# Patient Record
Sex: FEMALE | Race: WHITE | Employment: FULL TIME | URBAN - METROPOLITAN AREA
[De-identification: names, ages, dates, MRNs, and addresses within clinical notes are randomized per-mention and may not be internally consistent; named-entity substitution may affect disease eponyms.]

---

## 2021-12-19 ENCOUNTER — APPOINTMENT (OUTPATIENT)
Dept: CT IMAGING | Age: 26
End: 2021-12-19
Attending: EMERGENCY MEDICINE
Payer: MEDICAID

## 2021-12-19 ENCOUNTER — HOSPITAL ENCOUNTER (EMERGENCY)
Age: 26
Discharge: HOME | End: 2021-12-19
Attending: EMERGENCY MEDICINE
Payer: MEDICAID

## 2021-12-19 VITALS
SYSTOLIC BLOOD PRESSURE: 118 MMHG | WEIGHT: 130 LBS | TEMPERATURE: 97.6 F | RESPIRATION RATE: 18 BRPM | BODY MASS INDEX: 21.66 KG/M2 | HEART RATE: 75 BPM | OXYGEN SATURATION: 100 % | DIASTOLIC BLOOD PRESSURE: 78 MMHG | HEIGHT: 65 IN

## 2021-12-19 DIAGNOSIS — R11.2 NAUSEA AND VOMITING, INTRACTABILITY OF VOMITING NOT SPECIFIED, UNSPECIFIED VOMITING TYPE: ICD-10-CM

## 2021-12-19 DIAGNOSIS — R10.31 ABDOMINAL PAIN, RIGHT LOWER QUADRANT: Primary | ICD-10-CM

## 2021-12-19 DIAGNOSIS — N83.201 CYST OF RIGHT OVARY: ICD-10-CM

## 2021-12-19 LAB
ANION GAP SERPL CALC-SCNC: 2 MMOL/L (ref 5–15)
APPEARANCE UR: ABNORMAL
BACTERIA URNS QL MICRO: NEGATIVE /HPF
BASOPHILS # BLD: 0.1 K/UL (ref 0–0.1)
BASOPHILS NFR BLD: 1 % (ref 0–1)
BILIRUB UR QL: NEGATIVE
BUN SERPL-MCNC: 7 MG/DL (ref 6–20)
BUN/CREAT SERPL: 10 (ref 12–20)
CA-I BLD-MCNC: 9.4 MG/DL (ref 8.5–10.1)
CHLORIDE SERPL-SCNC: 109 MMOL/L (ref 97–108)
CO2 SERPL-SCNC: 28 MMOL/L (ref 21–32)
COLOR UR: ABNORMAL
CREAT SERPL-MCNC: 0.72 MG/DL (ref 0.55–1.02)
DIFFERENTIAL METHOD BLD: NORMAL
EOSINOPHIL # BLD: 0.3 K/UL (ref 0–0.4)
EOSINOPHIL NFR BLD: 3 % (ref 0–7)
ERYTHROCYTE [DISTWIDTH] IN BLOOD BY AUTOMATED COUNT: 12.6 % (ref 11.5–14.5)
GLUCOSE SERPL-MCNC: 89 MG/DL (ref 65–100)
GLUCOSE UR STRIP.AUTO-MCNC: NEGATIVE MG/DL
HCT VFR BLD AUTO: 39.5 % (ref 35–47)
HGB BLD-MCNC: 13.2 G/DL (ref 11.5–16)
HGB UR QL STRIP: NEGATIVE
IMM GRANULOCYTES # BLD AUTO: 0 K/UL (ref 0–0.04)
IMM GRANULOCYTES NFR BLD AUTO: 0 % (ref 0–0.5)
KETONES UR QL STRIP.AUTO: 5 MG/DL
LEUKOCYTE ESTERASE UR QL STRIP.AUTO: NEGATIVE
LIPASE SERPL-CCNC: 86 U/L (ref 73–393)
LYMPHOCYTES # BLD: 2.3 K/UL (ref 0.8–3.5)
LYMPHOCYTES NFR BLD: 32 % (ref 12–49)
MCH RBC QN AUTO: 31.1 PG (ref 26–34)
MCHC RBC AUTO-ENTMCNC: 33.4 G/DL (ref 30–36.5)
MCV RBC AUTO: 93.2 FL (ref 80–99)
MONOCYTES # BLD: 0.4 K/UL (ref 0–1)
MONOCYTES NFR BLD: 5 % (ref 5–13)
MUCOUS THREADS URNS QL MICRO: ABNORMAL /LPF
NEUTS SEG # BLD: 4.4 K/UL (ref 1.8–8)
NEUTS SEG NFR BLD: 59 % (ref 32–75)
NITRITE UR QL STRIP.AUTO: NEGATIVE
NRBC # BLD: 0 K/UL (ref 0–0.01)
NRBC BLD-RTO: 0 PER 100 WBC
PH UR STRIP: 8 [PH] (ref 5–8)
PLATELET # BLD AUTO: 218 K/UL (ref 150–400)
PMV BLD AUTO: 9.7 FL (ref 8.9–12.9)
POTASSIUM SERPL-SCNC: 4 MMOL/L (ref 3.5–5.1)
PROT UR STRIP-MCNC: NEGATIVE MG/DL
RBC # BLD AUTO: 4.24 M/UL (ref 3.8–5.2)
RBC #/AREA URNS HPF: ABNORMAL /HPF (ref 0–5)
SODIUM SERPL-SCNC: 139 MMOL/L (ref 136–145)
SP GR UR REFRACTOMETRY: 1.02 (ref 1–1.03)
UROBILINOGEN UR QL STRIP.AUTO: 2 EU/DL (ref 0.1–1)
WBC # BLD AUTO: 7.4 K/UL (ref 3.6–11)
WBC URNS QL MICRO: ABNORMAL /HPF (ref 0–4)

## 2021-12-19 PROCEDURE — 99283 EMERGENCY DEPT VISIT LOW MDM: CPT

## 2021-12-19 PROCEDURE — 83690 ASSAY OF LIPASE: CPT

## 2021-12-19 PROCEDURE — 74177 CT ABD & PELVIS W/CONTRAST: CPT

## 2021-12-19 PROCEDURE — 74011000636 HC RX REV CODE- 636: Performed by: EMERGENCY MEDICINE

## 2021-12-19 PROCEDURE — 74011250636 HC RX REV CODE- 250/636: Performed by: NURSE PRACTITIONER

## 2021-12-19 PROCEDURE — 85025 COMPLETE CBC W/AUTO DIFF WBC: CPT

## 2021-12-19 PROCEDURE — 96375 TX/PRO/DX INJ NEW DRUG ADDON: CPT

## 2021-12-19 PROCEDURE — 81001 URINALYSIS AUTO W/SCOPE: CPT

## 2021-12-19 PROCEDURE — 74011250636 HC RX REV CODE- 250/636: Performed by: EMERGENCY MEDICINE

## 2021-12-19 PROCEDURE — 36415 COLL VENOUS BLD VENIPUNCTURE: CPT

## 2021-12-19 PROCEDURE — 80048 BASIC METABOLIC PNL TOTAL CA: CPT

## 2021-12-19 PROCEDURE — 96374 THER/PROPH/DIAG INJ IV PUSH: CPT

## 2021-12-19 RX ORDER — ONDANSETRON 4 MG/1
4 TABLET, FILM COATED ORAL
Qty: 20 TABLET | Refills: 0 | Status: SHIPPED | OUTPATIENT
Start: 2021-12-19

## 2021-12-19 RX ORDER — KETOROLAC TROMETHAMINE 30 MG/ML
15 INJECTION, SOLUTION INTRAMUSCULAR; INTRAVENOUS
Status: COMPLETED | OUTPATIENT
Start: 2021-12-19 | End: 2021-12-19

## 2021-12-19 RX ORDER — KETOROLAC TROMETHAMINE 10 MG/1
10 TABLET, FILM COATED ORAL
Qty: 20 TABLET | Refills: 0 | Status: SHIPPED | OUTPATIENT
Start: 2021-12-19 | End: 2021-12-24

## 2021-12-19 RX ORDER — ONDANSETRON 2 MG/ML
4 INJECTION INTRAMUSCULAR; INTRAVENOUS
Status: COMPLETED | OUTPATIENT
Start: 2021-12-19 | End: 2021-12-19

## 2021-12-19 RX ADMIN — IOPAMIDOL 100 ML: 755 INJECTION, SOLUTION INTRAVENOUS at 16:11

## 2021-12-19 RX ADMIN — KETOROLAC TROMETHAMINE 15 MG: 30 INJECTION, SOLUTION INTRAMUSCULAR; INTRAVENOUS at 15:22

## 2021-12-19 RX ADMIN — ONDANSETRON 4 MG: 2 INJECTION INTRAMUSCULAR; INTRAVENOUS at 15:22

## 2021-12-19 RX ADMIN — SODIUM CHLORIDE 1000 ML: 9 INJECTION, SOLUTION INTRAVENOUS at 15:22

## 2021-12-19 NOTE — Clinical Note
[address] [facility] EMERGENCY DEPT  [address] [place] [address] [place] [address]  [phone]    Work/School Note    Date: 12/19/2021    To Whom It May concern:    Saman Mendenhall was seen and treated today in the emergency room by the following provider(s):  Attending Provider: Antonella Henry MD.      Saman Mendenhall is excused from work/school on 12/19/21 and 12/20/21. She is medically clear to return to work/school on 12/21/2021.        Sincerely,          Craig Pena MD

## 2021-12-19 NOTE — ED PROVIDER NOTES
EMERGENCY DEPARTMENT HISTORY AND PHYSICAL EXAM      Date: 12/19/2021  Patient Name: John Novak    History of Presenting Illness     Chief Complaint   Patient presents with    Abdominal Pain       History Provided By: Patient    HPI: John Novak, 32 y.o. female with a past medical history significant No significant past medical history presents to the ED with cc of 2 days history of right lower quadrant abdominal pain. Patient ports pain is squeezing, without radiation, worse with movement or palpation, improved with rest.  Patient also reports dysuria. Patient denies fevers or chills, reports nausea without vomiting. There are no other complaints, changes, or physical findings at this time. PCP: None    No current facility-administered medications on file prior to encounter. Current Outpatient Medications on File Prior to Encounter   Medication Sig Dispense Refill    [DISCONTINUED] risperiDONE (RISPERDAL) 1 mg tablet Take 0.5 mg by mouth two (2) times a day.  [DISCONTINUED] norgestimate-ethinyl estradiol (ORTHO-CYCLEN) 0.25-35 mg-mcg per tablet Take 1 Tab by mouth daily. 1 Package 11       Past History     Past Medical History:  Past Medical History:   Diagnosis Date    Psychiatric problem        Past Surgical History:  History reviewed. No pertinent surgical history. Family History:  History reviewed. No pertinent family history. Social History:  Social History     Tobacco Use    Smoking status: Current Every Day Smoker    Smokeless tobacco: Current User   Substance Use Topics    Alcohol use: Yes    Drug use: Never       Allergies:  No Known Allergies      Review of Systems   Review of Systems   Constitutional: Negative for chills and fever. HENT: Negative for sinus pressure and sinus pain. Eyes: Negative for photophobia and redness. Respiratory: Negative for shortness of breath and wheezing. Cardiovascular: Negative for chest pain and palpitations. Gastrointestinal: Positive for abdominal pain and nausea. Genitourinary: Negative for flank pain and hematuria. Musculoskeletal: Negative for arthralgias and gait problem. Skin: Negative for color change and pallor. Neurological: Negative for dizziness and weakness. Review of Systems    Physical Exam   Physical Exam  Constitutional:       General: No acute distress. Appearance: Normal appearance. Not toxic-appearing. HENT:      Head: Normocephalic and atraumatic. Nose: Nose normal.      Mouth/Throat:      Mouth: Mucous membranes are moist.   Eyes:      Extraocular Movements: Extraocular movements intact. Pupils: Pupils are equal, round, and reactive to light. Cardiovascular:      Rate and Rhythm: Normal rate. Pulses: Normal pulses. Pulmonary:      Effort: Pulmonary effort is normal.      Breath sounds: No stridor. Abdominal:      General: Abdomen is flat. There is no distension. Right lower quadrant tenderness with voluntary guarding without rebound. Musculoskeletal:         General: Normal range of motion. Cervical back: Normal range of motion and neck supple. Skin:     General: Skin is warm and dry. Capillary Refill: Capillary refill takes less than 2 seconds. Neurological:      General: No focal deficit present. Mental Status: Aert and oriented to person, place, and time.    Psychiatric:         Mood and Affect: Mood normal.         Behavior: Behavior normal.       Physical Exam    Lab and Diagnostic Study Results     Labs -     Recent Results (from the past 12 hour(s))   URINALYSIS W/MICROSCOPIC    Collection Time: 12/19/21  3:00 PM   Result Value Ref Range    Color Yellow/Straw      Appearance Turbid (A) Clear      Specific gravity 1.019 1.003 - 1.030      pH (UA) 8.0 5.0 - 8.0      Protein Negative Negative mg/dL    Glucose Negative Negative mg/dL    Ketone 5 (A) Negative mg/dL    Bilirubin Negative Negative      Blood Negative Negative Urobilinogen 2.0 (H) 0.1 - 1.0 EU/dL    Nitrites Negative Negative      Leukocyte Esterase Negative Negative      WBC 0-4 0 - 4 /hpf    RBC 0-5 0 - 5 /hpf    Bacteria Negative Negative /hpf    Mucus Trace /lpf   CBC WITH AUTOMATED DIFF    Collection Time: 12/19/21  3:00 PM   Result Value Ref Range    WBC 7.4 3.6 - 11.0 K/uL    RBC 4.24 3.80 - 5.20 M/uL    HGB 13.2 11.5 - 16.0 g/dL    HCT 39.5 35.0 - 47.0 %    MCV 93.2 80.0 - 99.0 FL    MCH 31.1 26.0 - 34.0 PG    MCHC 33.4 30.0 - 36.5 g/dL    RDW 12.6 11.5 - 14.5 %    PLATELET 310 082 - 239 K/uL    MPV 9.7 8.9 - 12.9 FL    NRBC 0.0 0.0  WBC    ABSOLUTE NRBC 0.00 0.00 - 0.01 K/uL    NEUTROPHILS 59 32 - 75 %    LYMPHOCYTES 32 12 - 49 %    MONOCYTES 5 5 - 13 %    EOSINOPHILS 3 0 - 7 %    BASOPHILS 1 0 - 1 %    IMMATURE GRANULOCYTES 0 0 - 0.5 %    ABS. NEUTROPHILS 4.4 1.8 - 8.0 K/UL    ABS. LYMPHOCYTES 2.3 0.8 - 3.5 K/UL    ABS. MONOCYTES 0.4 0.0 - 1.0 K/UL    ABS. EOSINOPHILS 0.3 0.0 - 0.4 K/UL    ABS. BASOPHILS 0.1 0.0 - 0.1 K/UL    ABS. IMM. GRANS. 0.0 0.00 - 0.04 K/UL    DF AUTOMATED     METABOLIC PANEL, BASIC    Collection Time: 12/19/21  3:00 PM   Result Value Ref Range    Sodium 139 136 - 145 mmol/L    Potassium 4.0 3.5 - 5.1 mmol/L    Chloride 109 (H) 97 - 108 mmol/L    CO2 28 21 - 32 mmol/L    Anion gap 2 (L) 5 - 15 mmol/L    Glucose 89 65 - 100 mg/dL    BUN 7 6 - 20 mg/dL    Creatinine 0.72 0.55 - 1.02 mg/dL    BUN/Creatinine ratio 10 (L) 12 - 20      GFR est AA >60 >60 ml/min/1.73m2    GFR est non-AA >60 >60 ml/min/1.73m2    Calcium 9.4 8.5 - 10.1 mg/dL   LIPASE    Collection Time: 12/19/21  3:00 PM   Result Value Ref Range    Lipase 86 73 - 393 U/L       Radiologic Studies -   @[email or web address]@  CT Results  (Last 48 hours)               12/19/21 1610  CT ABD PELV W CONT Final result    Impression:  1. Collapsing right ovarian follicle. Small free pelvic fluid is physiologic in   volume. 2. No urinary or bowel dilation.        Narrative:  Right lower quadrant pain. No comparison. Technique: Axial images abdomen pelvis with IV contrast and multiplanar   reformatting. 100 cc Isovue-370 administered IV. Dose reduction: All CT scans at this facility are performed using dose reduction   optimization techniques as appropriate to a performed exam including the   following: Automated exposure control, adjustments of the mA and/or kV according   to patient's size, or use of iterative reconstruction technique. FINDINGS: Lung bases clear. Liver, gallbladder, spleen, pancreas, adrenal   glands, kidneys, bladder, uterus unremarkable. No urinary collecting system   dilation. Abdominal aorta without aneurysm or dissection. Multiple follicles   left ovary. Dominant follicle right ovary appears slightly crenulated, 1.9 cm,   likely collapsing. There is a small amount of free fluid in the pelvis. The   bowel is not dilated. Visualized portion of the appendix contains air, is   nondilated, lies closely adjacent to the right ovary. No free air or   lymphadenopathy. Bony structures intact. CXR Results  (Last 48 hours)    None            Medical Decision Making   - I am the first provider for this patient. - I reviewed the vital signs, available nursing notes, past medical history, past surgical history, family history and social history. - Initial assessment performed. The patients presenting problems have been discussed, and they are in agreement with the care plan formulated and outlined with them. I have encouraged them to ask questions as they arise throughout their visit. Vital Signs-Reviewed the patient's vital signs. Patient Vitals for the past 12 hrs:   Temp Pulse Resp BP SpO2   12/19/21 1440 97.6 °F (36.4 °C) 75 18 118/78 100 %       ED Course:     ED Course as of 12/19/21 1655   Sun Dec 19, 2021   1653 Patient reports feeling better at this time.   Discussed the findings consistent with right ovarian cyst, otherwise nonsurgical abdomen. Will treat with pain and nausea medicine, discussed need for close follow-up as well as return precautions. [CS]      ED Course User Index  [CS] Abhijit Oliver MD       Disposition   Disposition: DC- Adult Discharges: All of the diagnostic tests were reviewed and questions answered. Diagnosis, care plan and treatment options were discussed. The patient understands the instructions and will follow up as directed. The patients results have been reviewed with them. They have been counseled regarding their diagnosis. The patient verbally convey understanding and agreement of the signs, symptoms, diagnosis, treatment and prognosis and additionally agrees to follow up as recommended with their PCP in 24 - 48 hours. They also agree with the care-plan and convey that all of their questions have been answered. I have also put together some discharge instructions for them that include: 1) educational information regarding their diagnosis, 2) how to care for their diagnosis at home, as well a 3) list of reasons why they would want to return to the ED prior to their follow-up appointment, should their condition change. DC-The patient was given verbal follow-up instructions        DISCHARGE PLAN:  1. There are no discharge medications for this patient. 2.   Follow-up Information     Follow up With Specialties Details Why [address] [place] [address] [id number]   As needed [address] [place] [address]  [phone]        3. Return to ED if worse   4. Current Discharge Medication List      START taking these medications    Details   ketorolac (TORADOL) 10 mg tablet Take 1 Tablet by mouth every six (6) hours as needed for Pain for up to 5 days. Qty: 20 Tablet, Refills: 0  Start date: 12/19/2021, End date: 12/24/2021      ondansetron hcl (Zofran) 4 mg tablet Take 1 Tablet by mouth every eight (8) hours as needed for Nausea.   Qty: 20 Tablet, Refills: 0  Start date: 12/19/2021               Diagnosis     Clinical Impression:   1. Abdominal pain, right lower quadrant    2. Nausea and vomiting, intractability of vomiting not specified, unspecified vomiting type    3. Cyst of right ovary        Attestations:    Juan Bales MD    Please note that this dictation was completed with [employer], the computer voice recognition software. Quite often unanticipated grammatical, syntax, homophones, and other interpretive errors are inadvertently transcribed by the computer software. Please disregard these errors. Please excuse any errors that have escaped final proofreading. Thank you.

## 2021-12-19 NOTE — DISCHARGE INSTRUCTIONS
Thank you! Thank you for allowing me to care for you in the emergency department. I sincerely hope that you are satisfied with your visit today. It is my goal to provide you with excellent care. Below you will find a list of your labs and imaging from your visit today. Should you have any questions regarding these results please do not hesitate to call the emergency department. Labs -     Recent Results (from the past 12 hour(s))   URINALYSIS W/MICROSCOPIC    Collection Time: 12/19/21  3:00 PM   Result Value Ref Range    Color Yellow/Straw      Appearance Turbid (A) Clear      Specific gravity 1.019 1.003 - 1.030      pH (UA) 8.0 5.0 - 8.0      Protein Negative Negative mg/dL    Glucose Negative Negative mg/dL    Ketone 5 (A) Negative mg/dL    Bilirubin Negative Negative      Blood Negative Negative      Urobilinogen 2.0 (H) 0.1 - 1.0 EU/dL    Nitrites Negative Negative      Leukocyte Esterase Negative Negative      WBC 0-4 0 - 4 /hpf    RBC 0-5 0 - 5 /hpf    Bacteria Negative Negative /hpf    Mucus Trace /lpf   CBC WITH AUTOMATED DIFF    Collection Time: 12/19/21  3:00 PM   Result Value Ref Range    WBC 7.4 3.6 - 11.0 K/uL    RBC 4.24 3.80 - 5.20 M/uL    HGB 13.2 11.5 - 16.0 g/dL    HCT 39.5 35.0 - 47.0 %    MCV 93.2 80.0 - 99.0 FL    MCH 31.1 26.0 - 34.0 PG    MCHC 33.4 30.0 - 36.5 g/dL    RDW 12.6 11.5 - 14.5 %    PLATELET 843 399 - 900 K/uL    MPV 9.7 8.9 - 12.9 FL    NRBC 0.0 0.0  WBC    ABSOLUTE NRBC 0.00 0.00 - 0.01 K/uL    NEUTROPHILS 59 32 - 75 %    LYMPHOCYTES 32 12 - 49 %    MONOCYTES 5 5 - 13 %    EOSINOPHILS 3 0 - 7 %    BASOPHILS 1 0 - 1 %    IMMATURE GRANULOCYTES 0 0 - 0.5 %    ABS. NEUTROPHILS 4.4 1.8 - 8.0 K/UL    ABS. LYMPHOCYTES 2.3 0.8 - 3.5 K/UL    ABS. MONOCYTES 0.4 0.0 - 1.0 K/UL    ABS. EOSINOPHILS 0.3 0.0 - 0.4 K/UL    ABS. BASOPHILS 0.1 0.0 - 0.1 K/UL    ABS. IMM.  GRANS. 0.0 0.00 - 0.04 K/UL    DF AUTOMATED     METABOLIC PANEL, BASIC    Collection Time: 12/19/21  3:00 PM Result Value Ref Range    Sodium 139 136 - 145 mmol/L    Potassium 4.0 3.5 - 5.1 mmol/L    Chloride 109 (H) 97 - 108 mmol/L    CO2 28 21 - 32 mmol/L    Anion gap 2 (L) 5 - 15 mmol/L    Glucose 89 65 - 100 mg/dL    BUN 7 6 - 20 mg/dL    Creatinine 0.72 0.55 - 1.02 mg/dL    BUN/Creatinine ratio 10 (L) 12 - 20      GFR est AA >60 >60 ml/min/1.73m2    GFR est non-AA >60 >60 ml/min/1.73m2    Calcium 9.4 8.5 - 10.1 mg/dL   LIPASE    Collection Time: 12/19/21  3:00 PM   Result Value Ref Range    Lipase 86 73 - 393 U/L       Radiologic Studies -   CT ABD PELV W CONT   Final Result   1. Collapsing right ovarian follicle. Small free pelvic fluid is physiologic in   volume. 2. No urinary or bowel dilation. CT Results  (Last 48 hours)                 12/19/21 1610  CT ABD PELV W CONT Final result    Impression:  1. Collapsing right ovarian follicle. Small free pelvic fluid is physiologic in   volume. 2. No urinary or bowel dilation. Narrative:  Right lower quadrant pain. No comparison. Technique: Axial images abdomen pelvis with IV contrast and multiplanar   reformatting. 100 cc Isovue-370 administered IV. Dose reduction: All CT scans at this facility are performed using dose reduction   optimization techniques as appropriate to a performed exam including the   following: Automated exposure control, adjustments of the mA and/or kV according   to patient's size, or use of iterative reconstruction technique. FINDINGS: Lung bases clear. Liver, gallbladder, spleen, pancreas, adrenal   glands, kidneys, bladder, uterus unremarkable. No urinary collecting system   dilation. Abdominal aorta without aneurysm or dissection. Multiple follicles   left ovary. Dominant follicle right ovary appears slightly crenulated, 1.9 cm,   likely collapsing. There is a small amount of free fluid in the pelvis. The   bowel is not dilated.  Visualized portion of the appendix contains air, is   nondilated, lies closely adjacent to the right ovary. No free air or   lymphadenopathy. Bony structures intact. CXR Results  (Last 48 hours)      None               If you feel that you have not received excellent quality care or timely care, please ask to speak to the nurse manager. Please choose us in the future for your continued health care needs. ------------------------------------------------------------------------------------------------------------  The exam and treatment you received in the Emergency Department were for an urgent problem and are not intended as complete care. It is important that you follow-up with a doctor, nurse practitioner, or physician assistant to:  (1) confirm your diagnosis,  (2) re-evaluation of changes in your illness and treatment, and  (3) for ongoing care. If your symptoms become worse or you do not improve as expected and you are unable to reach your usual health care provider, you should return to the Emergency Department. We are available 24 hours a day. Please take your discharge instructions with you when you go to your follow-up appointment. If you have any problem arranging a follow-up appointment, contact the Emergency Department immediately. If a prescription has been provided, please have it filled as soon as possible to prevent a delay in treatment. Read the entire medication instruction sheet provided to you by the pharmacy. If you have any questions or reservations about taking the medication due to side effects or interactions with other medications, please call your primary care physician or contact the ER to speak with the charge nurse. Make an appointment with your family doctor or the physician you were referred to for follow-up of this visit as instructed on your discharge paperwork, as this is a mandatory follow-up. Return to the ER if you are unable to be seen or if you are unable to be seen in a timely manner.     If you have any problem arranging the follow-up visit, contact the Emergency Department immediately.

## 2022-01-10 ENCOUNTER — HOSPITAL ENCOUNTER (EMERGENCY)
Age: 27
Discharge: HOME | End: 2022-01-10
Payer: MEDICAID

## 2022-01-10 VITALS
TEMPERATURE: 97.9 F | RESPIRATION RATE: 16 BRPM | WEIGHT: 120 LBS | HEART RATE: 105 BPM | SYSTOLIC BLOOD PRESSURE: 117 MMHG | BODY MASS INDEX: 19.99 KG/M2 | HEIGHT: 65 IN | DIASTOLIC BLOOD PRESSURE: 74 MMHG | OXYGEN SATURATION: 100 %

## 2022-01-10 DIAGNOSIS — K08.89 DENTALGIA: Primary | ICD-10-CM

## 2022-01-10 DIAGNOSIS — J06.9 VIRAL URI WITH COUGH: ICD-10-CM

## 2022-01-10 PROCEDURE — 74011250637 HC RX REV CODE- 250/637: Performed by: PHYSICIAN ASSISTANT

## 2022-01-10 PROCEDURE — U0005 INFEC AGEN DETEC AMPLI PROBE: HCPCS

## 2022-01-10 PROCEDURE — 99283 EMERGENCY DEPT VISIT LOW MDM: CPT

## 2022-01-10 RX ORDER — HYDROCODONE BITARTRATE AND ACETAMINOPHEN 5; 325 MG/1; MG/1
1 TABLET ORAL ONCE
Status: COMPLETED | OUTPATIENT
Start: 2022-01-10 | End: 2022-01-10

## 2022-01-10 RX ORDER — PENICILLIN V POTASSIUM 500 MG/1
500 TABLET, FILM COATED ORAL 4 TIMES DAILY
Qty: 28 TABLET | Refills: 0 | Status: SHIPPED | OUTPATIENT
Start: 2022-01-10 | End: 2022-01-17

## 2022-01-10 RX ADMIN — HYDROCODONE BITARTRATE AND ACETAMINOPHEN 1 TABLET: 5; 325 TABLET ORAL at 15:46

## 2022-01-10 NOTE — Clinical Note
[facility] EMERGENCY DEPT  [address] [place] [id number]  [phone]    Work/School Note    Date: 1/10/2022    To Whom It May concern:    Yakelin Peralta was seen and treated today in the emergency room by the following provider(s):  Physician Assistant: Galileo Sofia PA-C. Yakelin Peralta is excused from work/school on 01/10/22 and 01/11/22. She is medically clear to return to work/school on 1/12/2022.        Sincerely,          Johanne De Souza PA-C

## 2022-01-10 NOTE — ED NOTES
Pt ambulated A&ox4, GCS 15 to ED lobby. In possession of personal belongings and discharge instructions.

## 2022-01-10 NOTE — ED PROVIDER NOTES
EMERGENCY DEPARTMENT HISTORY AND PHYSICAL EXAM      Date: 1/10/2022  Patient Name: Randy Vega    History of Presenting Illness     Chief Complaint   Patient presents with    Dental Pain    Covid Testing       History Provided By: Patient    HPI: Randy Vega, 32 y.o. female with a past medical history significant psychiatric problem presents to the ED with cc of dental pain. Pt reports pain to the back upper left-side of her mouth. States that she has a dentist appointment tomorrow but missed work today and is requesting work note. Pt denies any fever, chills, difficulty swallowing, voice change, facial swelling, sore throat, or purulent drainage from site of dental pain. Pt additionally requesting to be tested for covid. She reports sick contacts who recently tested positive for covid. Pt denies being vaccinated against covid yet. States that she is otherwise well and has no further concerns. She specifically denies any chest pain, shortness of breath, cough, congestion, abdominal pain, nausea, vomit, diarrhea, difficulty urinating, dysuria, hematuria, headaches, light headedness, dizziness, diaphoresis, or rash. There are no other complaints, changes, or physical findings at this time. PCP: None    No current facility-administered medications on file prior to encounter. Current Outpatient Medications on File Prior to Encounter   Medication Sig Dispense Refill    ondansetron hcl (Zofran) 4 mg tablet Take 1 Tablet by mouth every eight (8) hours as needed for Nausea. 20 Tablet 0       Past History     Past Medical History:  Past Medical History:   Diagnosis Date    Psychiatric problem        Past Surgical History:  History reviewed. No pertinent surgical history. Family History:  History reviewed. No pertinent family history.     Social History:  Social History     Tobacco Use    Smoking status: Current Every Day Smoker     Packs/day: 0.50    Smokeless tobacco: Current User Vaping Use    Vaping Use: Every day    Substances: Nicotine, THC, CBD   Substance Use Topics    Alcohol use: Yes     Comment: rarely     Drug use: Yes     Types: Marijuana       Allergies:  No Known Allergies      Review of Systems     Review of Systems   Constitutional: Negative. Negative for chills, diaphoresis and fever. HENT: Positive for dental problem. Negative for congestion, rhinorrhea and sore throat. Eyes: Negative. Respiratory: Negative. Negative for cough, chest tightness, shortness of breath and wheezing. Cardiovascular: Negative. Negative for chest pain and palpitations. Gastrointestinal: Negative. Negative for abdominal pain, diarrhea, nausea and vomiting. Genitourinary: Negative. Negative for difficulty urinating, dysuria, flank pain, frequency and hematuria. Musculoskeletal: Negative. Skin: Negative. Negative for rash. Neurological: Negative. Negative for dizziness, weakness, numbness and headaches. Psychiatric/Behavioral: Negative. All other systems reviewed and are negative. Physical Exam     Physical Exam  Vitals and nursing note reviewed. Constitutional:       General: She is not in acute distress. Appearance: Normal appearance. She is not ill-appearing or toxic-appearing. HENT:      Head: Normocephalic and atraumatic. Mouth/Throat:      Mouth: Mucous membranes are moist.      Dentition: Dental caries present. No dental abscesses. Pharynx: Oropharynx is clear. Eyes:      Extraocular Movements: Extraocular movements intact. Conjunctiva/sclera: Conjunctivae normal.      Pupils: Pupils are equal, round, and reactive to light. Cardiovascular:      Rate and Rhythm: Normal rate and regular rhythm. Pulses: Normal pulses. Heart sounds: Normal heart sounds. No murmur heard. No friction rub. No gallop. Pulmonary:      Effort: Pulmonary effort is normal.      Breath sounds: Normal breath sounds.  No wheezing, rhonchi or rales.   Abdominal:      General: There is no distension. Palpations: Abdomen is soft. Tenderness: There is no abdominal tenderness. There is no guarding or rebound. Musculoskeletal:      Cervical back: Neck supple. No tenderness. Skin:     General: Skin is warm and dry. Capillary Refill: Capillary refill takes less than 2 seconds. Findings: No rash. Neurological:      General: No focal deficit present. Mental Status: She is alert and oriented to person, place, and time. Psychiatric:         Mood and Affect: Mood normal.         Behavior: Behavior normal.         Lab and Diagnostic Study Results     Labs -   No results found for this or any previous visit (from the past 12 hour(s)). Radiologic Studies -   @lastxrresult@  CT Results  (Last 48 hours)    None        CXR Results  (Last 48 hours)    None            Medical Decision Making   - I am the first provider for this patient. - I reviewed the vital signs, available nursing notes, past medical history, past surgical history, family history and social history. - Initial assessment performed. The patients presenting problems have been discussed, and they are in agreement with the care plan formulated and outlined with them. I have encouraged them to ask questions as they arise throughout their visit. Vital Signs-Reviewed the patient's vital signs. Patient Vitals for the past 12 hrs:   Temp Pulse Resp BP SpO2   01/10/22 1435 97.9 °F (36.6 °C) (!) 105 16 117/74 100 %       Records Reviewed: Nursing Notes and Old Medical Records       Provider Notes (Medical Decision Making):     MDM  Number of Diagnoses or Management Options  Dentalgia  Viral URI with cough  Diagnosis management comments: Patient presents with dental pain. Stable vitals and exam without obvious abscess that needs drainage. Airway stable and patient speaking in full sentences. No red flags that make PTA, RPA, ludwigs angina concerning.  Will tx with dental ball, dental block PRN, antibiotics and outpatient analgesics. Pt was given information on dentists and importance of followup and no smoking. Amount and/or Complexity of Data Reviewed  Clinical lab tests: ordered and reviewed  Review and summarize past medical records: yes       ED Course:   3:37 PM  Pt reassessed. She has been advised to maintain appointment with dentist tomorrow and to start taking abx in the meantime. She has been educated regarding strict return precautions and conveys good understanding and agreement with care plan as outlined. Pt agrees to follow up as previously scheduled. She has also been informed that covid is a send out test and that she will be notified within the next 3-5 days if positive. She has no additional complaints or concerns and all of her questions have been answered. Anticipate discharge home shortly. Procedures   Medical Decision Makingedical Decision Making  Performed by: Mike Cantor PA-C  PROCEDURES:  Procedures       Disposition   Disposition: DC- Adult Discharges: All of the diagnostic tests were reviewed and questions answered. Diagnosis, care plan and treatment options were discussed. The patient understands the instructions and will follow up as directed. The patients results have been reviewed with them. They have been counseled regarding their diagnosis. The patient verbally convey understanding and agreement of the signs, symptoms, diagnosis, treatment and prognosis and additionally agrees to follow up as recommended with their PCP in 24 - 48 hours. They also agree with the care-plan and convey that all of their questions have been answered.   I have also put together some discharge instructions for them that include: 1) educational information regarding their diagnosis, 2) how to care for their diagnosis at home, as well a 3) list of reasons why they would want to return to the ED prior to their follow-up appointment, should their condition change. DISCHARGE PLAN:  1. Current Discharge Medication List      CONTINUE these medications which have NOT CHANGED    Details   ondansetron hcl (Zofran) 4 mg tablet Take 1 Tablet by mouth every eight (8) hours as needed for Nausea. Qty: 20 Tablet, Refills: 0           2. Follow-up Information     Follow up With Specialties Details Why [address] [place] [address] Dentistry Schedule an appointment as soon as possible for a visit in 1 week  [address] [place] [address]  [phone]        3. Return to ED if worse   4. Current Discharge Medication List      START taking these medications    Details   penicillin v potassium (VEETID) 500 mg tablet Take 1 Tablet by mouth four (4) times daily for 7 days. Qty: 28 Tablet, Refills: 0  Start date: 1/10/2022, End date: 1/17/2022               Diagnosis     Clinical Impression:   1. Dentalgia    2. Viral URI with cough        Attestations:    Zachariah De Souza PA-C    Please note that this dictation was completed with [employer], the computer voice recognition software. Quite often unanticipated grammatical, syntax, homophones, and other interpretive errors are inadvertently transcribed by the computer software. Please disregard these errors. Please excuse any errors that have escaped final proofreading. Thank you.

## 2022-01-10 NOTE — ED TRIAGE NOTES
[facility] 15 pt stated that he has been having increased dental pain for a week; pt also wanted a COVID test d/t the fact that those around him on New Year's have COVID now

## 2022-01-11 LAB — SARS-COV-2, COV2: NORMAL

## 2022-01-12 LAB
SARS-COV-2, XPLCVT: NOT DETECTED
SOURCE, COVRS: NORMAL

## 2022-10-10 ENCOUNTER — HOSPITAL ENCOUNTER (EMERGENCY)
Age: 27
Discharge: HOME | End: 2022-10-10
Attending: EMERGENCY MEDICINE
Payer: MEDICAID

## 2022-10-10 ENCOUNTER — APPOINTMENT (OUTPATIENT)
Dept: CT IMAGING | Age: 27
End: 2022-10-10
Attending: EMERGENCY MEDICINE
Payer: MEDICAID

## 2022-10-10 VITALS
TEMPERATURE: 98 F | BODY MASS INDEX: 22.49 KG/M2 | WEIGHT: 135 LBS | HEART RATE: 89 BPM | SYSTOLIC BLOOD PRESSURE: 123 MMHG | DIASTOLIC BLOOD PRESSURE: 73 MMHG | OXYGEN SATURATION: 100 % | HEIGHT: 65 IN | RESPIRATION RATE: 18 BRPM

## 2022-10-10 DIAGNOSIS — T71.193A ASSAULT BY MANUAL STRANGULATION: ICD-10-CM

## 2022-10-10 DIAGNOSIS — S20.20XA CONTUSION OF TRUNK, INITIAL ENCOUNTER: ICD-10-CM

## 2022-10-10 DIAGNOSIS — S00.03XA CONTUSION OF SCALP, INITIAL ENCOUNTER: ICD-10-CM
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
## 2022-10-10 LAB
CA-I BLD-MCNC: 1.12 MMOL/L (ref 1.12–1.32)
CHLORIDE BLD-SCNC: 102 MMOL/L (ref 98–107)
COMMENT, HOLDF: NORMAL
CREAT BLD-MCNC: 0.73 MG/DL (ref 0.6–1.3)
GLUCOSE BLD-MCNC: 77 MG/DL (ref 65–100)
POTASSIUM BLD-SCNC: 3.1 MMOL/L (ref 3.5–5.1)
SAMPLES BEING HELD,HOLD: NORMAL
SERVICE CMNT-IMP: ABNORMAL
SODIUM BLD-SCNC: 139 MMOL/L (ref 136–145)

## 2022-10-10 PROCEDURE — 74011250636 HC RX REV CODE- 250/636: Performed by: EMERGENCY MEDICINE

## 2022-10-10 PROCEDURE — 99285 EMERGENCY DEPT VISIT HI MDM: CPT

## 2022-10-10 PROCEDURE — 36415 COLL VENOUS BLD VENIPUNCTURE: CPT

## 2022-10-10 PROCEDURE — 70498 CT ANGIOGRAPHY NECK: CPT

## 2022-10-10 PROCEDURE — 96374 THER/PROPH/DIAG INJ IV PUSH: CPT

## 2022-10-10 PROCEDURE — 74011000636 HC RX REV CODE- 636: Performed by: EMERGENCY MEDICINE

## 2022-10-10 PROCEDURE — 70450 CT HEAD/BRAIN W/O DYE: CPT

## 2022-10-10 RX ORDER — ACETAMINOPHEN 500 MG
1000 TABLET ORAL
Qty: 20 TABLET | Refills: 0 | Status: SHIPPED | OUTPATIENT
Start: 2022-10-10

## 2022-10-10 RX ORDER — IBUPROFEN 800 MG/1
800 TABLET ORAL
Qty: 20 TABLET | Refills: 0 | Status: SHIPPED | OUTPATIENT
Start: 2022-10-10 | End: 2022-10-17

## 2022-10-10 RX ORDER — KETOROLAC TROMETHAMINE 30 MG/ML
15 INJECTION, SOLUTION INTRAMUSCULAR; INTRAVENOUS
Status: COMPLETED | OUTPATIENT
Start: 2022-10-10 | End: 2022-10-10

## 2022-10-10 RX ADMIN — IOPAMIDOL 100 ML: 755 INJECTION, SOLUTION INTRAVENOUS at 14:29

## 2022-10-10 RX ADMIN — KETOROLAC TROMETHAMINE 15 MG: 30 INJECTION, SOLUTION INTRAMUSCULAR at 13:18
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
## 2022-10-10 NOTE — ED NOTES
Health Maintenance Summary     Topic Due On Due Status Completed On    MAMMOGRAM - BREAST CANCER SCREENING Jun 30, 2017 Not Due Jun 30, 2015    Colorectal Cancer Screening - Colonoscopy Aug 28, 2025 Not Due Aug 28, 2015    Pap Smear - Cervical Cancer Screening  Feb 1, 1985 Overdue     Immunization - Td/Tdap Oct 1, 2024 Not Due Oct 1, 2014    Immunization-Zoster  Completed Aug 12, 2016    Diabetes Eye Exam Apr 3, 2017 Due Soon Apr 3, 2016    Glycohemoglobin A1C  (Diabetes Sugar)  May 14, 2017 Not Due Nov 14, 2016    GFR  (Kidney Function Test)  Nov 17, 2017 Not Due Nov 17, 2016    Immunization - TDAP Pregnancy  Hidden     Diabetes Foot Exam  Nov 17, 2017 Not Due Nov 17, 2016    Immunization-Influenza  Completed Nov 17, 2016          Patient is due for topics as listed above, she wishes to discuss with provider .       Pt given discharge instructions, patient education, 2 prescriptions, and follow up information. Pt verbalizes understanding. All questions answered. Pt discharged to home in private vehicle, ambulatory. Pt A/Ox4, RA, pain controlled.

## 2022-10-10 NOTE — ED NOTES
Radiology to pick pt up shortly for CT scans. Pt informed of upcoming scans by this RN. Pt resting in position of comfort in locked and lowered bed, side rails x 2.    1430: Pt in CT.
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
## 2022-10-10 NOTE — Clinical Note
[address]  [facility] EMERGENCY DEPT  [address] [id number] [phone]    Work/School Note    Date: 10/10/2022    To Whom It May concern:      Leanne Hebert was seen and treated today in the emergency room by the following provider(s):  Attending Provider: Afshan Grullon MD.      Leanne Hebert is excused from work/school on 10/10/22. She is clear to return to work/school on 10/11/22.         Sincerely,          Annika Gordon MD
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
## 2022-10-31 ENCOUNTER — APPOINTMENT (OUTPATIENT)
Dept: GENERAL RADIOLOGY | Age: 27
End: 2022-10-31
Attending: EMERGENCY MEDICINE
Payer: MEDICAID

## 2022-10-31 ENCOUNTER — HOSPITAL ENCOUNTER (EMERGENCY)
Age: 27
Discharge: HOME | End: 2022-10-31
Attending: EMERGENCY MEDICINE
Payer: MEDICAID

## 2022-10-31 VITALS
TEMPERATURE: 98.4 F | RESPIRATION RATE: 16 BRPM | BODY MASS INDEX: 21.66 KG/M2 | DIASTOLIC BLOOD PRESSURE: 64 MMHG | SYSTOLIC BLOOD PRESSURE: 102 MMHG | HEART RATE: 85 BPM | OXYGEN SATURATION: 100 % | HEIGHT: 65 IN | WEIGHT: 130 LBS

## 2022-10-31 DIAGNOSIS — U07.1 COVID-19: Primary | ICD-10-CM

## 2022-10-31 LAB
COMMENT, HOLDF: NORMAL
COVID-19 RAPID TEST, COVR: NOT DETECTED
SAMPLES BEING HELD,HOLD: NORMAL
SOURCE, COVRS: NORMAL
TROPONIN I BLD-MCNC: <0.04 NG/ML (ref 0–0.08)

## 2022-10-31 PROCEDURE — 99284 EMERGENCY DEPT VISIT MOD MDM: CPT

## 2022-10-31 PROCEDURE — 36415 COLL VENOUS BLD VENIPUNCTURE: CPT

## 2022-10-31 PROCEDURE — 93005 ELECTROCARDIOGRAM TRACING: CPT

## 2022-10-31 PROCEDURE — 87635 SARS-COV-2 COVID-19 AMP PRB: CPT

## 2022-10-31 PROCEDURE — 71045 X-RAY EXAM CHEST 1 VIEW: CPT

## 2022-10-31 NOTE — DISCHARGE INSTRUCTIONS
All of your tests are normal.  If you have any thoughts of suicide, immediately return to the emergency department. Follow-up with your counselor tomorrow.

## 2022-10-31 NOTE — ED NOTES
Patient resting in position of comfort in locked and lowered bed, call bell within reach. Pt denies additional needs.

## 2022-10-31 NOTE — Clinical Note
[address]  [facility] EMERGENCY DEPT  [phone] [id number] [phone]    Work/School Note    Date: 10/31/2022    To Whom It May concern:    Alanna Barry was seen and treated today in the emergency room by the following provider(s):  Attending Provider: Roel Chandler MD  Physician Assistant: Dm Bravo PA-C. Alanna Barry is excused from work/school on 10/31/2022 through 11/2/2022. She is medically clear to return to work/school on 11/3/2022.          Sincerely,          Darrel Dacosta PA-C

## 2022-10-31 NOTE — BSMART NOTE
BSMART assessment completed, and suicide risk level noted to be low. Primary Nurse Filiberto Watson and PA Jayne Herrera notified. Concerns not observed. Security/Off- has not been notified.

## 2022-10-31 NOTE — ED TRIAGE NOTES
Pt arrives via [facility] from the [facility], pt's primary residence, with cc of positive covid test 1 week ago, chronic SOB, generalized body aches, and chest congestion/CP. Pt reports needing \"proof of positive covid test for substance abuse group\". Pt reports \"smoking crack\" yesterday. Pt reports chronic SI. Pt states, \"I feel suicidal all the time, but I'm too scared to act on that shit. \" Pt denies having a plan. Pt denies HI.
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
## 2022-10-31 NOTE — ED NOTES
Pt resting in position of comfort in locked and lowered bed, call bell within reach. 2:53 PM  BSMART, Jluis Lind, to assess patient via teledoc.

## 2022-10-31 NOTE — ED PROVIDER NOTES
Positive For Covid-19  Associated symptoms: cough    Associated symptoms: no chest pain, no confusion, no congestion, no diarrhea, no myalgias, no nausea and no vomiting    Shortness of Breath  Associated symptoms include a fever and cough. Pertinent negatives include no wheezing, no chest pain, no vomiting and no abdominal pain. Chest Pain (Angina)   Associated symptoms include cough and a fever. Pertinent negatives include no abdominal pain, no dizziness, no nausea, no numbness, no shortness of breath and no vomiting. Addiction problem  There areno confusion and no seizures present at this time. Associated symptoms include a fever. Pertinent negatives include no nausea and no vomiting. Patient is a 32 y.o. F (for pronouns are he/him) who presents today with request for COVID-19 testing. He reports 1 week ago, he began with cough, fever. Hent positive at home COVID-19 test.  Reports that he is in substance abuse rehab program but was unable to go to the program because of COVID-19 diagnosis. For the last week, he has had a productive cough with yellow sputum, intermittent fevers. He is here today for repeat COVID-19 testing, reports he needs this to return to drug rehab facility. Presently is living at a hotel. Does admit to daily crack cocaine use, last used early this morning. Denies any chest pain, shortness of breath, nausea, vomiting, abdominal pain. Patient does admit to suicidal ideation when asked screening questions by nursing staff. States that he would like to die, does not actively have a plan. Denies any HI, hallucinations. PMH: Substance abuse  Surgical History: None  Smoking: Cigarettes  Alcohol: Daily  Drug Use: Daily crack cocaine  ALLERGIES: Patient has no known allergies. Past Medical History:   Diagnosis Date    Psychiatric problem      History reviewed. No pertinent surgical history. History reviewed. No pertinent family history.   Social History     Socioeconomic History    Marital status: SINGLE     Spouse name: Not on file    Number of children: Not on file    Years of education: Not on file    Highest education level: Not on file   Occupational History    Not on file   Tobacco Use    Smoking status: Every Day     Packs/day: 0.25     Types: Cigarettes    Smokeless tobacco: Current   Vaping Use    Vaping Use: Every day    Substances: Nicotine, THC, CBD   Substance and Sexual Activity    Alcohol use: Yes     Comment: rarely     Drug use: Yes     Types: Marijuana, Methamphetamines, Cocaine    Sexual activity: Yes     Partners: Female   Other Topics Concern    Not on file   Social History Narrative    Not on file     Social Determinants of Health     Financial Resource Strain: Not on file   Food Insecurity: Not on file   Transportation Needs: Not on file   Physical Activity: Not on file   Stress: Not on file   Social Connections: Not on file   Intimate Partner Violence: Not on file   Housing Stability: Not on file             Review of Systems   Constitutional:  Positive for fever. Negative for fatigue. HENT:  Negative for congestion. Respiratory:  Positive for cough. Negative for shortness of breath and wheezing. Cardiovascular:  Negative for chest pain. Gastrointestinal:  Negative for abdominal pain, constipation, diarrhea, nausea and vomiting. Genitourinary:  Negative for flank pain. Musculoskeletal:  Negative for arthralgias and myalgias. Skin:  Negative for wound. Neurological:  Negative for dizziness, seizures, light-headedness and numbness. Psychiatric/Behavioral:  Negative for confusion. Vitals:    10/31/22 1123 10/31/22 1138 10/31/22 1153 10/31/22 1208   BP: 122/78 119/81 126/70 123/79   Pulse:       Resp:       Temp:       SpO2: 100% 100% 100% 100%   Weight:       Height:                Physical Exam  Vitals and nursing note reviewed. Constitutional:       General: She is not in acute distress. Appearance: Normal appearance.  She is not ill-appearing, toxic-appearing or diaphoretic. HENT:      Head: Normocephalic and atraumatic. Nose: Nose normal.      Mouth/Throat:      Mouth: Mucous membranes are moist.      Pharynx: Oropharynx is clear. Uvula midline. Tonsils: No tonsillar exudate. Cardiovascular:      Rate and Rhythm: Normal rate and regular rhythm. Pulmonary:      Effort: Pulmonary effort is normal. No respiratory distress. Breath sounds: Normal breath sounds. No stridor. No wheezing, rhonchi or rales. Chest:      Chest wall: No tenderness. Musculoskeletal:         General: Normal range of motion. Cervical back: Normal range of motion. Skin:     General: Skin is warm and dry. Neurological:      Mental Status: She is alert and oriented to person, place, and time. Mental status is at baseline. Psychiatric:         Mood and Affect: Mood normal.         Behavior: Behavior normal.         Thought Content: Thought content normal.            LABORATORY RESULTS:  Recent Results (from the past 24 hour(s))   EKG, 12 LEAD, INITIAL    Collection Time: 10/31/22 10:59 AM   Result Value Ref Range    Ventricular Rate 87 BPM    Atrial Rate 87 BPM    P-R Interval 128 ms    QRS Duration 96 ms    Q-T Interval 360 ms    QTC Calculation (Bezet) 433 ms    Calculated P Axis 67 degrees    Calculated R Axis 46 degrees    Calculated T Axis 48 degrees    Diagnosis       Normal sinus rhythm  Incomplete right bundle branch block  Cannot rule out Anterior infarct , age undetermined  Abnormal ECG  When compared with ECG of 01-FEB-2016 13:31,  Vent. rate has decreased BY  46 BPM     SAMPLES BEING HELD    Collection Time: 10/31/22 11:05 AM   Result Value Ref Range    SAMPLES BEING HELD 1LAV 1PST     COMMENT        Add-on orders for these samples will be processed based on acceptable specimen integrity and analyte stability, which may vary by analyte.    POC TROPONIN-I    Collection Time: 10/31/22 11:07 AM   Result Value Ref Range Troponin-I (POC) <0.04 0.00 - 0.08 ng/mL   COVID-19 RAPID TEST    Collection Time: 10/31/22 11:29 AM   Result Value Ref Range    Specimen source Nasopharyngeal      COVID-19 rapid test Not detected NOTD         IMAGING RESULTS:  XR CHEST PORT    Result Date: 10/31/2022  No acute process. EKG INTERPRETATION:   See course summary for interpretation    MEDICATIONS GIVEN:  Medications - No data to display         MDM  Patient is a 77-year-old biological female who presents today with complaints of cough, fever intermittently for the last week, positive COVID-19 test 1 week ago. Here today for repeat COVID-19 testing so that he can reenter drug addiction program.  Physical exam is unremarkable, lungs are clear to auscultation bilaterally, normal oropharynx, oxygen saturations 100% on room air, afebrile. Chest x-ray shows no acute cardiopulmonary process. COVID-19 testing is negative today. Explained to patient that if he is still symptomatic with productive cough, fever, myalgias, he is still contagious. Patient does admit suicidal ideation when asked screening questions by nursing staff. No plan, no HI, no hallucinations, no objective evidence to suggest delusions. We will have [facility] evaluate patient. ED Course as of 10/31/22 1527   Mon Oct 31, 2022   1524 Patient eval by [facility], no indication for inpatient evaluation. Patient has an appointment tomorrow with counseling. Given strict return precautions if patient were to feel any suicidal ideation. [KJ]      ED Course User Index  [KJ] German Lobato PA-C       Discussed results and work-up with patient and answered all questions, the patient expresses understanding and agrees with the care plan and disposition. The patient was given an opportunity to ask questions and all concerns raised were addressed prior to discharge. Recommended patient follow-up with provider as listed below. Counseled patient on standard home and self-care measures. Specifically explained the emergent conditions that could arise and clearly instructed the patient to return to the emergency department for those and any other new, worsening, or concerning symptoms. Patient stable and ready for discharge. IMPRESSION:  1.  COVID-19        DISPOSITION:  Discharge    PLAN:  Follow-up Information       Follow up With Specialties Details [id number] [phone]          Current Discharge Medication List

## 2022-10-31 NOTE — ED NOTES
Bsmart tele doc at beside. Waiting for teledoc to come on.  Bed locked and lowered pt denies other needs at this time

## 2022-11-01 LAB
ATRIAL RATE: 87 BPM
CALCULATED P AXIS, ECG09: 67 DEGREES
CALCULATED R AXIS, ECG10: 46 DEGREES
CALCULATED T AXIS, ECG11: 48 DEGREES
DIAGNOSIS, 93000: NORMAL
P-R INTERVAL, ECG05: 128 MS
Q-T INTERVAL, ECG07: 360 MS
QRS DURATION, ECG06: 96 MS
QTC CALCULATION (BEZET), ECG08: 433 MS
VENTRICULAR RATE, ECG03: 87 BPM

## 2023-05-23 RX ORDER — ONDANSETRON 4 MG/1
4 TABLET, FILM COATED ORAL EVERY 8 HOURS PRN
COMMUNITY
Start: 2021-12-19

## 2023-05-23 RX ORDER — ACETAMINOPHEN 500 MG
1000 TABLET ORAL EVERY 6 HOURS PRN
COMMUNITY
Start: 2022-10-10

## 2023-07-01 ENCOUNTER — APPOINTMENT (OUTPATIENT)
Facility: HOSPITAL | Age: 28
End: 2023-07-01
Payer: MEDICAID

## 2023-07-01 ENCOUNTER — HOSPITAL ENCOUNTER (EMERGENCY)
Facility: HOSPITAL | Age: 28
Discharge: HOME | End: 2023-07-01
Attending: STUDENT IN AN ORGANIZED HEALTH CARE EDUCATION/TRAINING PROGRAM
Payer: MEDICAID

## 2023-07-01 VITALS
RESPIRATION RATE: 16 BRPM | SYSTOLIC BLOOD PRESSURE: 130 MMHG | DIASTOLIC BLOOD PRESSURE: 81 MMHG | TEMPERATURE: 98 F | HEART RATE: 76 BPM | HEIGHT: 65 IN | WEIGHT: 160 LBS | BODY MASS INDEX: 26.66 KG/M2 | OXYGEN SATURATION: 100 %

## 2023-07-01 DIAGNOSIS — S01.01XA LACERATION OF SCALP, INITIAL ENCOUNTER: ICD-10-CM

## 2023-07-01 DIAGNOSIS — F10.920 ACUTE ALCOHOLIC INTOXICATION WITHOUT COMPLICATION (HCC): Primary | ICD-10-CM

## 2023-07-01 PROCEDURE — 70450 CT HEAD/BRAIN W/O DYE: CPT

## 2023-07-01 PROCEDURE — 6360000002 HC RX W HCPCS: Performed by: STUDENT IN AN ORGANIZED HEALTH CARE EDUCATION/TRAINING PROGRAM

## 2023-07-01 PROCEDURE — 93005 ELECTROCARDIOGRAM TRACING: CPT

## 2023-07-01 PROCEDURE — 99284 EMERGENCY DEPT VISIT MOD MDM: CPT

## 2023-07-01 PROCEDURE — 90471 IMMUNIZATION ADMIN: CPT | Performed by: STUDENT IN AN ORGANIZED HEALTH CARE EDUCATION/TRAINING PROGRAM

## 2023-07-01 PROCEDURE — 72125 CT NECK SPINE W/O DYE: CPT

## 2023-07-01 PROCEDURE — 12002 RPR S/N/AX/GEN/TRNK2.6-7.5CM: CPT

## 2023-07-01 PROCEDURE — 90714 TD VACC NO PRESV 7 YRS+ IM: CPT | Performed by: STUDENT IN AN ORGANIZED HEALTH CARE EDUCATION/TRAINING PROGRAM

## 2023-07-01 PROCEDURE — 6370000000 HC RX 637 (ALT 250 FOR IP): Performed by: STUDENT IN AN ORGANIZED HEALTH CARE EDUCATION/TRAINING PROGRAM

## 2023-07-01 RX ORDER — TETANUS AND DIPHTHERIA TOXOIDS ADSORBED 2; 2 [LF]/.5ML; [LF]/.5ML
0.5 INJECTION INTRAMUSCULAR ONCE
Status: COMPLETED | OUTPATIENT
Start: 2023-07-01 | End: 2023-07-01

## 2023-07-01 RX ADMIN — TETANUS AND DIPHTHERIA TOXOIDS ADSORBED 0.5 ML: 2; 2 INJECTION INTRAMUSCULAR at 03:05

## 2023-07-01 RX ADMIN — Medication 3 ML: at 03:04

## 2023-07-01 ASSESSMENT — LIFESTYLE VARIABLES
HOW OFTEN DO YOU HAVE A DRINK CONTAINING ALCOHOL: MONTHLY OR LESS
HOW MANY STANDARD DRINKS CONTAINING ALCOHOL DO YOU HAVE ON A TYPICAL DAY: 1 OR 2

## 2023-07-07 ENCOUNTER — HOSPITAL ENCOUNTER (EMERGENCY)
Facility: HOSPITAL | Age: 28
Discharge: HOME | End: 2023-07-07

## 2023-07-07 VITALS
OXYGEN SATURATION: 100 % | SYSTOLIC BLOOD PRESSURE: 132 MMHG | HEIGHT: 65 IN | TEMPERATURE: 98.4 F | RESPIRATION RATE: 18 BRPM | DIASTOLIC BLOOD PRESSURE: 75 MMHG | BODY MASS INDEX: 23.66 KG/M2 | HEART RATE: 77 BPM | WEIGHT: 142 LBS

## 2023-07-07 DIAGNOSIS — Z48.02: Primary | ICD-10-CM

## 2023-07-07 ASSESSMENT — LIFESTYLE VARIABLES
HOW OFTEN DO YOU HAVE A DRINK CONTAINING ALCOHOL: NEVER
HOW MANY STANDARD DRINKS CONTAINING ALCOHOL DO YOU HAVE ON A TYPICAL DAY: PATIENT DOES NOT DRINK

## 2023-07-07 NOTE — ED PROVIDER NOTES
[facility] EMERGENCY DEPT  EMERGENCY DEPARTMENT HISTORY AND PHYSICAL EXAM      Date: 7/7/2023  Patient Name: Kirsten Ortega  MRN: [id number]  [address] [facility] [facility] 1995  Date of evaluation: 7/7/2023  Provider: BRYAN Brown NP   Note Started: 2:51 PM EDT 7/7/23    HISTORY OF PRESENT ILLNESS     Chief Complaint   Patient presents with    Suture / Staple Removal              History Provided By: Patient    HPI: Kirsten Ortega is a 32 y.o. female past medical history of psychiatric disorder and other history reviewed as listed below presents for staple removal from scalp placed one week ago after she fell and was seen in this ED. Denies any current complaints or needs other than staple removal. Denies headache, N/V, fever, dizziness or any difficulty since her last ED visit. PAST MEDICAL HISTORY   Past Medical History:  Past Medical History:   Diagnosis Date    Psychiatric problem        Past Surgical History:  No past surgical history on file. Family History:  No family history on file. Social History:  Social History     Tobacco Use    Smoking status: Every Day     Packs/day: 0.25     Types: Cigarettes    Smokeless tobacco: Current   Substance Use Topics    Alcohol use: Yes    Drug use: Yes     Types: Cocaine, Marijuana (Weed), Methamphetamines (Crystal Meth)       Allergies:  No Known Allergies    PCP: None None    Current Meds:   No current facility-administered medications for this encounter.      Current Outpatient Medications   Medication Sig Dispense Refill    acetaminophen (TYLENOL) 500 MG tablet Take 2 tablets by mouth every 6 hours as needed      ondansetron (ZOFRAN) 4 MG tablet Take 1 tablet by mouth every 8 hours as needed         Social Determinants of Health:   Social Determinants of Health     Tobacco Use: High Risk    Smoking Tobacco Use: Every Day    Smokeless Tobacco Use: Current    Passive Exposure: Not on file   Alcohol Use: Not At Risk    Frequency of Alcohol Consumption: Never

## 2024-07-03 ENCOUNTER — HOSPITAL ENCOUNTER (EMERGENCY)
Facility: HOSPITAL | Age: 29
Discharge: HOME | End: 2024-07-03
Attending: EMERGENCY MEDICINE
Payer: MEDICAID

## 2024-07-03 VITALS
BODY MASS INDEX: 26.66 KG/M2 | RESPIRATION RATE: 16 BRPM | OXYGEN SATURATION: 100 % | SYSTOLIC BLOOD PRESSURE: 116 MMHG | DIASTOLIC BLOOD PRESSURE: 75 MMHG | HEART RATE: 67 BPM | WEIGHT: 160 LBS | TEMPERATURE: 97.8 F | HEIGHT: 65 IN

## 2024-07-03 DIAGNOSIS — J06.9 URI WITH COUGH AND CONGESTION: Primary | ICD-10-CM

## 2024-07-03 DIAGNOSIS — R11.2 NAUSEA AND VOMITING, UNSPECIFIED VOMITING TYPE: ICD-10-CM

## 2024-07-03 PROCEDURE — 99283 EMERGENCY DEPT VISIT LOW MDM: CPT

## 2024-07-03 RX ORDER — OXYMETAZOLINE HYDROCHLORIDE 0.05 G/100ML
2 SPRAY NASAL 2 TIMES DAILY
Qty: 20 ML | Refills: 0 | Status: SHIPPED | OUTPATIENT
Start: 2024-07-03 | End: 2024-07-06

## 2024-07-03 RX ORDER — FEXOFENADINE HCL AND PSEUDOEPHEDRINE HCI 180; 240 MG/1; MG/1
1 TABLET, EXTENDED RELEASE ORAL DAILY
Qty: 30 TABLET | Refills: 0 | Status: SHIPPED | OUTPATIENT
Start: 2024-07-03

## 2024-07-03 RX ORDER — ACETAMINOPHEN 500 MG
1000 TABLET ORAL EVERY 6 HOURS PRN
Qty: 40 TABLET | Refills: 0 | Status: SHIPPED | OUTPATIENT
Start: 2024-07-03

## 2024-07-03 RX ORDER — GUAIFENESIN AND DEXTROMETHORPHAN HYDROBROMIDE 1200; 60 MG/1; MG/1
1 TABLET, EXTENDED RELEASE ORAL EVERY 12 HOURS PRN
Qty: 28 TABLET | Refills: 0 | Status: SHIPPED | OUTPATIENT
Start: 2024-07-03

## 2024-07-03 RX ORDER — FLUTICASONE PROPIONATE 50 MCG
2 SPRAY, SUSPENSION (ML) NASAL DAILY
Qty: 16 G | Refills: 0 | Status: SHIPPED | OUTPATIENT
Start: 2024-07-03

## 2024-07-03 ASSESSMENT — LIFESTYLE VARIABLES
HOW MANY STANDARD DRINKS CONTAINING ALCOHOL DO YOU HAVE ON A TYPICAL DAY: 1 OR 2
HOW OFTEN DO YOU HAVE A DRINK CONTAINING ALCOHOL: MONTHLY OR LESS

## 2024-07-03 ASSESSMENT — PAIN - FUNCTIONAL ASSESSMENT: PAIN_FUNCTIONAL_ASSESSMENT: 0-10

## 2024-07-03 ASSESSMENT — PAIN SCALES - GENERAL: PAINLEVEL_OUTOF10: 2

## 2024-07-03 NOTE — ED TRIAGE NOTES
Pt here with productive cough, nasal congestion, x 1 week. Reports an episode of diarrhea and vomiting x 1 today but thinks the vomiting x 1 came from the suboxone. Pt denies taking any OTC medications at home for symptoms.

## 2024-07-04 NOTE — ED PROVIDER NOTES
[facility] EMERGENCY DEPT  EMERGENCY DEPARTMENT ENCOUNTER      Pt Name: Abby Yao  MRN: [id number]  Birthdate 1995  Date of evaluation: 7/3/2024  Provider: Dave Renee MD    CHIEF COMPLAINT       Chief Complaint   Patient presents with    Cough           HISTORY OF PRESENT ILLNESS    HPI  28 y.o. female presents with cough and congestion for the last 1.5 weeks. No difficulty breathing. No fever.  She did have an episode of vomiting this morning after taking Suboxone which has resolved.      Review of External Medical Records:     Nursing Notes were reviewed.    REVIEW OF SYSTEMS       Review of Systems    Except as noted above the remainder of the review of systems was reviewed and negative.       PAST MEDICAL HISTORY     Past Medical History:   Diagnosis Date    Psychiatric problem          SURGICAL HISTORY       History reviewed. No pertinent surgical history.      CURRENT MEDICATIONS       Discharge Medication List as of 7/3/2024  3:52 PM        CONTINUE these medications which have NOT CHANGED    Details   ondansetron (ZOFRAN) 4 MG tablet Take 1 tablet by mouth every 8 hours as neededHistorical Med             ALLERGIES     Vraylar [cariprazine]    FAMILY HISTORY     History reviewed. No pertinent family history.       SOCIAL HISTORY       Social History     Socioeconomic History    Marital status: Single     Spouse name: None    Number of children: None    Years of education: None    Highest education level: None   Tobacco Use    Smoking status: Former     Current packs/day: 0.25     Types: Cigarettes    Smokeless tobacco: Current   Vaping Use    Vaping Use: Every day    Substances: Nicotine   Substance and Sexual Activity    Alcohol use: Yes     Comment: casual    Drug use: Yes     Types: Cocaine, Marijuana (Weed), Methamphetamines (Crystal Meth)           PHYSICAL EXAM       Vitals:    07/03/24 1350   BP: 116/75   Pulse: 67   Resp: 16   Temp: 97.8 °F (36.6 °C)   SpO2: 100%        Body mass index

## 2024-07-23 ENCOUNTER — HOSPITAL ENCOUNTER (INPATIENT)
Facility: HOSPITAL | Age: 29
LOS: 5 days | Discharge: HOME | DRG: 751 | End: 2024-07-29
Attending: STUDENT IN AN ORGANIZED HEALTH CARE EDUCATION/TRAINING PROGRAM | Admitting: PSYCHIATRY & NEUROLOGY
Payer: MEDICAID

## 2024-07-23 DIAGNOSIS — T44.6X5A: Primary | ICD-10-CM

## 2024-07-23 DIAGNOSIS — T50.902A INTENTIONAL OVERDOSE, INITIAL ENCOUNTER (HCC): ICD-10-CM

## 2024-07-23 LAB
AMPHET UR QL SCN: POSITIVE
ANION GAP SERPL CALC-SCNC: 9 MMOL/L (ref 5–15)
APAP SERPL-MCNC: <2 UG/ML (ref 10–30)
APPEARANCE UR: ABNORMAL
BACTERIA URNS QL MICRO: ABNORMAL /HPF
BACTERIA URNS QL MICRO: ABNORMAL /HPF
BARBITURATES UR QL SCN: NEGATIVE
BASOPHILS # BLD: 0 K/UL (ref 0–0.1)
BASOPHILS NFR BLD: 1 % (ref 0–1)
BENZODIAZ UR QL: NEGATIVE
BILIRUB UR QL: NEGATIVE
BUN SERPL-MCNC: 12 MG/DL (ref 6–20)
BUN/CREAT SERPL: 18 (ref 12–20)
CA-I BLD-MCNC: 8.2 MG/DL (ref 8.5–10.1)
CANNABINOIDS UR QL SCN: POSITIVE
CHLORIDE SERPL-SCNC: 111 MMOL/L (ref 97–108)
CO2 SERPL-SCNC: 19 MMOL/L (ref 21–32)
COCAINE UR QL SCN: POSITIVE
COLOR UR: ABNORMAL
CREAT SERPL-MCNC: 0.66 MG/DL (ref 0.55–1.02)
DATE LAST DOSE: ABNORMAL
DATE LAST DOSE: ABNORMAL
DIFFERENTIAL METHOD BLD: ABNORMAL
DOSE AMOUNT: ABNORMAL UNITS
DOSE AMOUNT: ABNORMAL UNITS
EOSINOPHIL # BLD: 0.1 K/UL (ref 0–0.4)
EOSINOPHIL NFR BLD: 2 % (ref 0–7)
EPITH CASTS URNS QL MICRO: ABNORMAL /LPF
ERYTHROCYTE [DISTWIDTH] IN BLOOD BY AUTOMATED COUNT: 12 % (ref 11.5–14.5)
ETHANOL SERPL-MCNC: <10 MG/DL (ref 0–0.08)
GLUCOSE SERPL-MCNC: 100 MG/DL (ref 65–100)
GLUCOSE UR STRIP.AUTO-MCNC: NEGATIVE MG/DL
HCG UR QL: NEGATIVE
HCG, URINE, POC: NEGATIVE
HCT VFR BLD AUTO: 29.3 % (ref 35–47)
HGB BLD-MCNC: 10.7 G/DL (ref 11.5–16)
HGB UR QL STRIP: ABNORMAL
IMM GRANULOCYTES # BLD AUTO: 0 K/UL (ref 0–0.04)
IMM GRANULOCYTES NFR BLD AUTO: 0 % (ref 0–0.5)
KETONES UR QL STRIP.AUTO: 80 MG/DL
LEUKOCYTE ESTERASE UR QL STRIP.AUTO: ABNORMAL
LYMPHOCYTES # BLD: 1.5 K/UL (ref 0.8–3.5)
LYMPHOCYTES NFR BLD: 32 % (ref 12–49)
Lab: ABNORMAL
Lab: NORMAL
MCH RBC QN AUTO: 31 PG (ref 26–34)
MCHC RBC AUTO-ENTMCNC: 36.5 G/DL (ref 30–36.5)
MCV RBC AUTO: 84.9 FL (ref 80–99)
METHADONE UR QL: NEGATIVE
MONOCYTES # BLD: 0.4 K/UL (ref 0–1)
MONOCYTES NFR BLD: 10 % (ref 5–13)
MUCOUS THREADS URNS QL MICRO: ABNORMAL /LPF
NEGATIVE QC PASS/FAIL: NORMAL
NEUTS SEG # BLD: 2.5 K/UL (ref 1.8–8)
NEUTS SEG NFR BLD: 55 % (ref 32–75)
NITRITE UR QL STRIP.AUTO: NEGATIVE
NRBC # BLD: 0 K/UL (ref 0–0.01)
NRBC BLD-RTO: 0 PER 100 WBC
OPIATES UR QL: NEGATIVE
PCP UR QL: NEGATIVE
PH UR STRIP: 6 (ref 5–8)
PLATELET # BLD AUTO: 218 K/UL (ref 150–400)
PMV BLD AUTO: 9.7 FL (ref 8.9–12.9)
POSITIVE QC PASS/FAIL: NORMAL
POTASSIUM SERPL-SCNC: 3.1 MMOL/L (ref 3.5–5.1)
PROT UR STRIP-MCNC: 100 MG/DL
RBC # BLD AUTO: 3.45 M/UL (ref 3.8–5.2)
RBC #/AREA URNS HPF: >100 /HPF (ref 0–5)
RBC #/AREA URNS HPF: >100 /HPF (ref 0–5)
SALICYLATES SERPL-MCNC: <1.7 MG/DL (ref 2.8–20)
SODIUM SERPL-SCNC: 139 MMOL/L (ref 136–145)
SP GR UR REFRACTOMETRY: 1.03 (ref 1–1.03)
TROPONIN I SERPL HS-MCNC: <4 NG/L (ref 0–51)
UROBILINOGEN UR QL STRIP.AUTO: 0.1 EU/DL (ref 0.1–1)
WBC # BLD AUTO: 4.5 K/UL (ref 3.6–11)
WBC URNS QL MICRO: ABNORMAL /HPF (ref 0–4)
WBC URNS QL MICRO: ABNORMAL /HPF (ref 0–4)

## 2024-07-23 PROCEDURE — 80179 DRUG ASSAY SALICYLATE: CPT

## 2024-07-23 PROCEDURE — 36415 COLL VENOUS BLD VENIPUNCTURE: CPT

## 2024-07-23 PROCEDURE — 80048 BASIC METABOLIC PNL TOTAL CA: CPT

## 2024-07-23 PROCEDURE — 82077 ASSAY SPEC XCP UR&BREATH IA: CPT

## 2024-07-23 PROCEDURE — 2580000003 HC RX 258: Performed by: STUDENT IN AN ORGANIZED HEALTH CARE EDUCATION/TRAINING PROGRAM

## 2024-07-23 PROCEDURE — 99285 EMERGENCY DEPT VISIT HI MDM: CPT

## 2024-07-23 PROCEDURE — 84484 ASSAY OF TROPONIN QUANT: CPT

## 2024-07-23 PROCEDURE — 80143 DRUG ASSAY ACETAMINOPHEN: CPT

## 2024-07-23 PROCEDURE — 96360 HYDRATION IV INFUSION INIT: CPT

## 2024-07-23 PROCEDURE — 6370000000 HC RX 637 (ALT 250 FOR IP): Performed by: STUDENT IN AN ORGANIZED HEALTH CARE EDUCATION/TRAINING PROGRAM

## 2024-07-23 PROCEDURE — 81001 URINALYSIS AUTO W/SCOPE: CPT

## 2024-07-23 PROCEDURE — 93005 ELECTROCARDIOGRAM TRACING: CPT | Performed by: STUDENT IN AN ORGANIZED HEALTH CARE EDUCATION/TRAINING PROGRAM

## 2024-07-23 PROCEDURE — 81025 URINE PREGNANCY TEST: CPT

## 2024-07-23 PROCEDURE — 80307 DRUG TEST PRSMV CHEM ANLYZR: CPT

## 2024-07-23 PROCEDURE — 85025 COMPLETE CBC W/AUTO DIFF WBC: CPT

## 2024-07-23 RX ORDER — 0.9 % SODIUM CHLORIDE 0.9 %
1000 INTRAVENOUS SOLUTION INTRAVENOUS ONCE
Status: COMPLETED | OUTPATIENT
Start: 2024-07-23 | End: 2024-07-24

## 2024-07-23 RX ADMIN — SODIUM CHLORIDE 1000 ML: 9 INJECTION, SOLUTION INTRAVENOUS at 22:46

## 2024-07-23 RX ADMIN — POTASSIUM BICARBONATE 40 MEQ: 782 TABLET, EFFERVESCENT ORAL at 22:46

## 2024-07-23 ASSESSMENT — PAIN - FUNCTIONAL ASSESSMENT: PAIN_FUNCTIONAL_ASSESSMENT: NONE - DENIES PAIN

## 2024-07-24 PROBLEM — F33.0 MDD (MAJOR DEPRESSIVE DISORDER), RECURRENT EPISODE, MILD (HCC): Status: ACTIVE | Noted: 2024-07-24

## 2024-07-24 PROBLEM — F32.A DEPRESSION, UNSPECIFIED DEPRESSION TYPE: Status: ACTIVE | Noted: 2024-07-24

## 2024-07-24 LAB
EKG ATRIAL RATE: 107 BPM
EKG DIAGNOSIS: NORMAL
EKG P AXIS: 76 DEGREES
EKG P-R INTERVAL: 118 MS
EKG Q-T INTERVAL: 364 MS
EKG QRS DURATION: 90 MS
EKG QTC CALCULATION (BAZETT): 485 MS
EKG R AXIS: 69 DEGREES
EKG T AXIS: 59 DEGREES
EKG VENTRICULAR RATE: 107 BPM

## 2024-07-24 PROCEDURE — 6370000000 HC RX 637 (ALT 250 FOR IP): Performed by: PSYCHIATRY & NEUROLOGY

## 2024-07-24 PROCEDURE — 2580000003 HC RX 258: Performed by: INTERNAL MEDICINE

## 2024-07-24 PROCEDURE — 96361 HYDRATE IV INFUSION ADD-ON: CPT

## 2024-07-24 PROCEDURE — 1240000000 HC EMOTIONAL WELLNESS R&B

## 2024-07-24 PROCEDURE — 6370000000 HC RX 637 (ALT 250 FOR IP): Performed by: EMERGENCY MEDICINE

## 2024-07-24 RX ORDER — FLUOXETINE HYDROCHLORIDE 20 MG/1
20 CAPSULE ORAL DAILY
Status: DISCONTINUED | OUTPATIENT
Start: 2024-07-25 | End: 2024-07-25

## 2024-07-24 RX ORDER — HYDROXYZINE 50 MG/1
50 TABLET, FILM COATED ORAL 3 TIMES DAILY PRN
Status: DISCONTINUED | OUTPATIENT
Start: 2024-07-24 | End: 2024-07-29 | Stop reason: HOSPADM

## 2024-07-24 RX ORDER — NICOTINE 21 MG/24HR
1 PATCH, TRANSDERMAL 24 HOURS TRANSDERMAL DAILY
Status: DISCONTINUED | OUTPATIENT
Start: 2024-07-25 | End: 2024-07-29 | Stop reason: HOSPADM

## 2024-07-24 RX ORDER — MAGNESIUM HYDROXIDE/ALUMINUM HYDROXICE/SIMETHICONE 120; 1200; 1200 MG/30ML; MG/30ML; MG/30ML
30 SUSPENSION ORAL EVERY 6 HOURS PRN
Status: DISCONTINUED | OUTPATIENT
Start: 2024-07-24 | End: 2024-07-29 | Stop reason: HOSPADM

## 2024-07-24 RX ORDER — ACETAMINOPHEN 325 MG/1
650 TABLET ORAL EVERY 4 HOURS PRN
Status: DISCONTINUED | OUTPATIENT
Start: 2024-07-24 | End: 2024-07-29 | Stop reason: HOSPADM

## 2024-07-24 RX ORDER — BUSPIRONE HYDROCHLORIDE 15 MG/1
15 TABLET ORAL 2 TIMES DAILY
Status: ON HOLD | COMMUNITY
Start: 2024-06-28 | End: 2024-07-29 | Stop reason: HOSPADM

## 2024-07-24 RX ORDER — PRAZOSIN HYDROCHLORIDE 2 MG/1
2 CAPSULE ORAL NIGHTLY
Status: ON HOLD | COMMUNITY
Start: 2024-05-21 | End: 2024-07-29 | Stop reason: HOSPADM

## 2024-07-24 RX ORDER — SODIUM CHLORIDE, SODIUM LACTATE, POTASSIUM CHLORIDE, AND CALCIUM CHLORIDE .6; .31; .03; .02 G/100ML; G/100ML; G/100ML; G/100ML
1000 INJECTION, SOLUTION INTRAVENOUS ONCE
Status: COMPLETED | OUTPATIENT
Start: 2024-07-24 | End: 2024-07-24

## 2024-07-24 RX ORDER — FLUOXETINE HYDROCHLORIDE 20 MG/1
20 CAPSULE ORAL EVERY MORNING
Status: ON HOLD | COMMUNITY
Start: 2024-06-28 | End: 2024-07-29 | Stop reason: HOSPADM

## 2024-07-24 RX ORDER — TRAZODONE HYDROCHLORIDE 50 MG/1
50 TABLET ORAL NIGHTLY PRN
Status: DISCONTINUED | OUTPATIENT
Start: 2024-07-24 | End: 2024-07-29 | Stop reason: HOSPADM

## 2024-07-24 RX ORDER — SODIUM CHLORIDE, SODIUM LACTATE, POTASSIUM CHLORIDE, AND CALCIUM CHLORIDE .6; .31; .03; .02 G/100ML; G/100ML; G/100ML; G/100ML
1000 INJECTION, SOLUTION INTRAVENOUS ONCE
Status: DISCONTINUED | OUTPATIENT
Start: 2024-07-24 | End: 2024-07-24

## 2024-07-24 RX ORDER — LORAZEPAM 0.5 MG/1
0.5 TABLET ORAL EVERY 4 HOURS PRN
Status: DISCONTINUED | OUTPATIENT
Start: 2024-07-24 | End: 2024-07-27

## 2024-07-24 RX ADMIN — HYDROXYZINE HYDROCHLORIDE 50 MG: 50 TABLET, FILM COATED ORAL at 21:02

## 2024-07-24 RX ADMIN — LORAZEPAM 0.5 MG: 0.5 TABLET ORAL at 13:51

## 2024-07-24 RX ADMIN — SODIUM CHLORIDE, POTASSIUM CHLORIDE, SODIUM LACTATE AND CALCIUM CHLORIDE 1000 ML: 600; 310; 30; 20 INJECTION, SOLUTION INTRAVENOUS at 01:39

## 2024-07-24 RX ADMIN — BUSPIRONE HYDROCHLORIDE 15 MG: 10 TABLET ORAL at 21:02

## 2024-07-24 ASSESSMENT — SLEEP AND FATIGUE QUESTIONNAIRES
AVERAGE NUMBER OF SLEEP HOURS: 3
DO YOU HAVE DIFFICULTY SLEEPING: YES
DO YOU USE A SLEEP AID: NO

## 2024-07-24 NOTE — ED NOTES
Patient is being admitted to [facility] room # 241. Report given to Gurmeet. Patient is leaving stable accompanied by a sitter and security personnel.

## 2024-07-24 NOTE — ED TRIAGE NOTES
Pt arrives via EMS, called for SI attempt. Per EMS pt states his wife was trying to leave him and Pt took 38mg of prazosin @1300 today, pts normal dose is 3mg for BP. Per EMS pt states he waited to call EMS because he almost fell. Per EMS pt -150s, since receiving 700ml of NS in route pts HR has come down to 120s, sinus tach on monitor. BP 120s/60-70s.

## 2024-07-24 NOTE — BSMART NOTE
Writer spoke with Janis at Poison Control. Advised 12 hour observation period from initial ingestion- concerns are hypotension, tachycardia, and CNS depression.

## 2024-07-24 NOTE — ED NOTES
Pt changed into green gown and pt belongings placed into pt belonging bags and placed into assigned ED RM locker 22.    18.1

## 2024-07-24 NOTE — ED NOTES
Writer spoke with poison control for update on pt vitals. Notified pt currently getting second liter of fluids. Advised they will call back in a couple of hours to reassess BP and ambulatory status of pt.

## 2024-07-24 NOTE — BSMART NOTE
[facility] assessment completed, and suicide risk level noted to be high . Primary Nurse N/A  and Charge Nurse Katie  and Physician Merrick  notified. Concerns observed by pt currently has a strap on , on his person that he is declining to remove. This writer explained the safety concerns for the pt as well as the other patients on the unit. Pt stated \" I will be messed up in the head\". This writer explained the \" device will need to be wanded by security and that a 1:1 will be assigned throughout the entire hospital stay due to safety. Pt indicated he was \"ok\" with this. Writer was present when security wanded.     This writer spoke with Becka in risk management  and provided an update on the pt's  decision not to remove the device.     Pt has a 1:1 sitter.

## 2024-07-24 NOTE — ED PROVIDER NOTES
EMERGENCY DEPARTMENT HISTORY AND PHYSICAL EXAM      Date: 7/23/2024  Patient Name: Abby Yao  MRN: [id number]  YOB: 1995  Date of evaluation: 7/23/2024  Provider: Justin Humphrey MD     History of Present Illness     Chief Complaint   Patient presents with    Mental Health Problem       History Provided By: Patient    HPI: Abby Yao, 29 y.o. female to male with past medical history as listed and reviewed below presenting to the ED for eval ration of a intentional prazosin overdose.  Patient reports that about 1 PM she took about 30 tablets of 3 mg prazosin.  She also use methamphetamines today.  She notes it was an intentional overdose for self-harm as she was frustrated by her wife today.    Medical History     Past Medical History:  Past Medical History:   Diagnosis Date    Psychiatric problem        Past Surgical History:  No past surgical history on file.    Family History:  No family history on file.    Social History:  Social History     Tobacco Use    Smoking status: Former     Current packs/day: 0.25     Types: Cigarettes    Smokeless tobacco: Current   Vaping Use    Vaping Use: Every day    Substances: Nicotine   Substance Use Topics    Alcohol use: Yes     Comment: casual    Drug use: Yes     Types: Cocaine, Marijuana (Weed), Methamphetamines (Crystal Meth)       Allergies:  Allergies   Allergen Reactions    Vraylar [Cariprazine]      Psychosis       PCP: None, None    Current Medications:   Current Facility-Administered Medications   Medication Dose Route Frequency Provider Last Rate Last Admin    lactated ringers bolus 1,000 mL  1,000 mL IntraVENous Once Clif Mathis MD         Current Outpatient Medications   Medication Sig Dispense Refill    Dextromethorphan-guaiFENesin  MG TB12 Take 1 tablet by mouth every 12 hours as needed (cough and congestion) 28 tablet 0    fexofenadine-pseudoephedrine (ALLEGRA-D 24HR) 180-240 MG per extended release tablet Take 1 tablet by

## 2024-07-24 NOTE — ED NOTES
Writer spoke with poison control, updated on recent labs and EKG results. Notified of meds given and bolus currently infusing. Advised to continue to monitor pt until 0100. Will call back for further information at that time. ED MD made aware.

## 2024-07-24 NOTE — ED NOTES
Writer spoke with poison control for update on pt, this writer relayed recent vitals. Poison control concerned for soft BP despite IV fluids. Provider will contact poison control for further actions in plan of care.

## 2024-07-24 NOTE — BSMART NOTE
[facility] will complete assessment pending medical and poison control clearance.  [facility] is aware of consult but will wait pending patient medical needs.

## 2024-07-24 NOTE — ED NOTES
Poison control contacted that patient is being in observation for 24 hours and remains stable. Janis in poison control recommends ortho BP and if stable patient can be medically cleared. MD notified.

## 2024-07-25 PROCEDURE — 1240000000 HC EMOTIONAL WELLNESS R&B

## 2024-07-25 PROCEDURE — 6370000000 HC RX 637 (ALT 250 FOR IP): Performed by: PSYCHIATRY & NEUROLOGY

## 2024-07-25 RX ORDER — FLUOXETINE HYDROCHLORIDE 20 MG/1
40 CAPSULE ORAL DAILY
Status: DISCONTINUED | OUTPATIENT
Start: 2024-07-26 | End: 2024-07-29 | Stop reason: HOSPADM

## 2024-07-25 RX ADMIN — BUSPIRONE HYDROCHLORIDE 15 MG: 10 TABLET ORAL at 08:38

## 2024-07-25 RX ADMIN — HYDROXYZINE HYDROCHLORIDE 50 MG: 50 TABLET, FILM COATED ORAL at 17:40

## 2024-07-25 RX ADMIN — FLUOXETINE 20 MG: 20 CAPSULE ORAL at 08:38

## 2024-07-25 RX ADMIN — BUSPIRONE HYDROCHLORIDE 15 MG: 10 TABLET ORAL at 20:29

## 2024-07-25 NOTE — GROUP NOTE
Group Therapy Note    Date: 7/24/2024    Group Start Time: 1940  Group End Time: 2030  Group Topic: Recreational    SSR 2 BH NON ACUTE    Kailyn Scott        Group Therapy Note    Attendees: 6/9        Recreational Therapist facilitated structured leisure skills group to introduce healthy leisure skills as positive way to cope and manage mood.               Notes:  Did not attend group despite encouragement    Discipline Responsible: Recreational Therapist      Signature:  KJ Gallagher

## 2024-07-25 NOTE — CARE COORDINATION
07/25/24 1430   ITP   Date of Plan 07/25/24   Date of Next Review 08/01/24   Primary Diagnosis Code Depression, unspecified depression type   Barriers to Treatment Need for psychoeducation   Strengths Incorporated in Plan Seeking interactions;Acknowledging need for assistance   Plan of Care   Long Term Goal (LTG) Stated in patient/guardian terms \"I just need my meds and get out of here\"   Short Term Goal 1   Short Term Goal 1 Client will be oriented to program and staff, and participate in assessment process   Baseline Functioning Unknown   Target Pt will feel safe and cooperate with staff during treatment   Objectives Client will participate in group therapy   Intervention 1 Monitor medications   Frequency daily   Measured by Behavioral data;Self report;Staff observation   Staff Responsible [facility] staff;Clinical staff   Intervention 2 Assess safety   Frequency Daily   Measured by Behavioral data;Self report;Staff observation   Staff Responsible [facility] staff;Clinical staff   Intervention 3 Acknowledge client strengths   Frequency Daily   Measured by Behavioral data;Self report;Staff observation   Staff Responsible [facility] staff;Clinical staff   STG Goal 1 Status: Patient Appears to be  Progressing toward treatment plan goal   Short Term Goal 2   Short Term Goal 2 Client will maintain compliance with medication regime   Baseline Functioning Pt has not been taking medications and is using substances   Target Pt will maintain compliance with medication   Objectives Client will participate in group therapy   Intervention 1 Monitor medications   Frequency Daily   Measured by Behavioral data;Self report;Staff observation   Staff Responsible [facility] staff;Clinical staff   Intervention 2 Milieu therapy and support   Frequency Daily   Measured by Behavioral data;Self report;Staff observation   Staff Responsible [facility] staff;Clinical staff   Intervention 3 Acknowledge client strengths   Frequency daily   Measured by Behavioral data;Self

## 2024-07-25 NOTE — GROUP NOTE
Group Therapy Note    Date: 7/25/2024    Group Start Time: 1110  Group End Time: 1155  Group Topic: Recreational    SSR 2 BH NON ACUTE    Mary Brody        Group Therapy Note    Facilitated leisure skills group to reinforce positive coping and to manage mood through music, social interaction, group activities and art task       Attendees: 5/9       Notes:  Encouraged but did not attend    Discipline Responsible: Recreational Therapist      Signature:  KJ Maradiaga

## 2024-07-26 PROCEDURE — 1240000000 HC EMOTIONAL WELLNESS R&B

## 2024-07-26 PROCEDURE — 6370000000 HC RX 637 (ALT 250 FOR IP): Performed by: PSYCHIATRY & NEUROLOGY

## 2024-07-26 RX ORDER — OXCARBAZEPINE 150 MG/1
150 TABLET, FILM COATED ORAL 2 TIMES DAILY
Status: DISCONTINUED | OUTPATIENT
Start: 2024-07-26 | End: 2024-07-27

## 2024-07-26 RX ADMIN — OXCARBAZEPINE 150 MG: 150 TABLET, FILM COATED ORAL at 10:35

## 2024-07-26 RX ADMIN — BUSPIRONE HYDROCHLORIDE 15 MG: 10 TABLET ORAL at 21:00

## 2024-07-26 RX ADMIN — HYDROXYZINE HYDROCHLORIDE 50 MG: 50 TABLET, FILM COATED ORAL at 10:35

## 2024-07-26 RX ADMIN — BUSPIRONE HYDROCHLORIDE 15 MG: 10 TABLET ORAL at 08:47

## 2024-07-26 RX ADMIN — OXCARBAZEPINE 150 MG: 150 TABLET, FILM COATED ORAL at 21:00

## 2024-07-26 RX ADMIN — FLUOXETINE 40 MG: 20 CAPSULE ORAL at 08:46

## 2024-07-26 RX ADMIN — HYDROXYZINE HYDROCHLORIDE 50 MG: 50 TABLET, FILM COATED ORAL at 17:10

## 2024-07-26 NOTE — GROUP NOTE
Group Therapy Note    Date: 7/26/2024    Group Start Time: 1500  Group End Time: 1540  Group Topic: Process Group - Inpatient    SSR 2 [facility] ACUTE    Haydee Wu        Group Therapy Note  Pts interacted well with one another sharing their self-discovery questions. Writer provided the pts with questions on self-discovery with Self-exploration, Life purpose, Career/work, passions, self-awareness and relationships. To end group writer asked, \"what is your most positive quality.   Attendees: 3-4       Patient's Goal:  \"to learn how to communicate better\"    Notes:  Pt interacted well with others and provided positive feedback. He shared that his childhood was traumatic and that he did not learn to cope or communicate well with others about his feelings. 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 He started to cry when talking about how he would like to communicate better with his wife and learn more about himself.     Status After Intervention:  Improved    Participation Level: Active Listener and Interactive    Participation Quality: Appropriate, Attentive, and Sharing      Speech:  normal      Thought Process/Content: Logical      Affective Functioning: Congruent      Mood: depressed      Level of consciousness:  Alert, Oriented x4, and Attentive      Response to Learning: Able to verbalize current knowledge/experience, Able to verbalize/acknowledge new learning, Able to retain information, Capable of insight, and Progressing to goal      Endings: None Reported    Modes of Intervention: Education, Support, Socialization, and Exploration      Discipline Responsible: /Counselor      Signature:  Haydee Wu

## 2024-07-26 NOTE — GROUP NOTE
Group Therapy Note    Date: 7/26/2024    Group Start Time: 1035  Group End Time: 1115  Group Topic: Recreational    SSR 2  NON ACUTE    Mary Brody        Group Therapy Note    Facilitated leisure skills group to reinforce positive coping and to manage mood through music, social interaction, group activities and art task      Attendees: 4/7       Patient's Goal:  Attend group daily     Notes:  Pt was receptive to listening to music and songs selected while working on leisure task. Interacted with staff      Status After Intervention:  Improved    Participation Level: Active Listener and Interactive    Participation Quality: Appropriate and Attentive      Speech:  normal      Thought Process/Content: Logical      Affective Functioning: Congruent      Mood:  Calm      Level of consciousness:  Attentive      Response to Learning: Progressing to goal      Endings: None Reported    Modes of Intervention: Socialization and Activity      Discipline Responsible: Recreational Therapist      Signature:  KJ Maradiaga     Physical Therapy Visit    Visit Type: Daily Treatment Note  Visit: 3  Referring Provider: DIANNA Bone  Medical Diagnosis (from order): Z98.890 - Status post right foot surgery   Patient alert and oriented X3.    SUBJECTIVE                                                                                                               Has tightness through Right lower calf when pulling toe up to take a step.  Still really swollen; wearing compression which is helping some.  Elevating frequently but not icing frequently.  Towel stretch helps to loosen everything.  Wearing boot at night to sleep.  Riding stationary bike 5 miles a day which is going well.   Functional Change: Nothing new to report    Pain / Symptoms  - Pain rating (out of 10): Current: 6       OBJECTIVE                                                                                                                    Observation   Swelling to Right lateral delfino, dorsum of Right foot and ankle    Range of Motion (ROM)   (degrees unless noted; active unless noted; norms in ( ); negative=lacking to 0, positive=beyond 0)  Ankle:   - Dorsiflexion (20):       Right:  10                      Treatment     Therapeutic Exercise  Recumbent bike x 5 minutes, level 3, seat 12 -range, strength and activity tolerance while completing subjective questioning.      Long sitting:  Gastroc towel stretch 30 seconds x 2 right    Sitting:  Sit to stands x 10  Heel raises 2 x 15; 2nd set with 4# at knee -cues for slow and controlled movement    Standing:  Hip abduction x 10 Bilateral vs red theraband   Hip extension vs red theraband x 10 Bilateral   Mini squats x 2 -stopped due to gastroc pain        Manual Therapy   Supine with wedge under legs:  Edema massage to Left foot to calf  Soft tissue mobilization to Right gastroc and plantar fascia  Grade 2-3 talocrural mobilizations in dorsiflexion position  Passive dorsiflexion stretching      Neuromuscular Re-Education  Feet  together x 30 seconds  Tandem balance x 60 seconds Bilateral   Airex mat:  Feet apart with eyes closed x 30 seconds  Feet together with eyes open and closed x 30 seconds each    Skilled input: verbal instruction/cues, demonstration and as detailed above    Writer verbally educated and received verbal consent for hand placement, positioning of patient, and techniques to be performed today from patient for therapist position for techniques and hand placement and palpation for techniques as described above and how they are pertinent to the patient's plan of care.  Home Exercise Program  Access Code: [email or web address]  URL: [email or web address]  Date: 02/23/2024  Prepared by: Vi Jones    Exercises  - Seated Calf Towel Stretch  - 1 x daily - 7 x weekly - 3 sets - 30 hold  - Ankle Dorsiflexion with Resistance  - 2 x daily - 7 x weekly - 1 sets - 10 reps  - Ankle Eversion with Resistance  - 2 x daily - 7 x weekly - 1 sets - 10 reps  - Ankle Inversion with Resistance  - 2 x daily - 7 x weekly - 1 sets - 10 reps  - Ankle and Toe Plantarflexion with Resistance  - 2 x daily - 7 x weekly - 1 sets - 10 reps  - Arch Lifting  - 2 x daily - 7 x weekly - 1 sets - 10 reps  - Standing Hip Abduction with Resistance at Ankles and Unilateral Counter Support  - 2 x daily - 7 x weekly - 1-2 sets - 10-20 reps  - Standing Hip Extension with Resistance at Ankles and Unilateral Counter Support  - 2 x daily - 7 x weekly - 1-2 sets - 10-20 reps  - Tandem Stance  - 2 x daily - 7 x weekly - 1-2 sets - 2-3 reps - 30-60 hold  - Romberg Stance with Eyes Closed  - 2 x daily - 7 x weekly - 1-2 sets - 2-3 reps - 30 hold      ASSESSMENT                                                                                                            Right ankle dorsiflexion is improving towards full range.  Issued size D/E below knee tensoshape to assist with swelling as patient is currently wearing TEDS.  Instructed in wear and care of  tensoshape; patient verbalized understanding.  No pain with resting.  Progress weightbearing tolerance with strength and balance exercises.  Progressed Home Exercise Program and issued green band to progress strength as needed.  Recommended icing and elevating above the heart to assist with swelling.    Pain/symptoms after session (out of 10): 5 and 6  Education:   - Results of above outlined education: Verbalizes understanding and Needs reinforcement    PLAN                                                                                                                           Suggestions for next session as indicated: Progress per plan of care assess response to tensoshape,        Therapy procedure time and total treatment time can be found documented on the Time Entry flowsheet

## 2024-07-27 PROCEDURE — 6370000000 HC RX 637 (ALT 250 FOR IP)

## 2024-07-27 PROCEDURE — 6370000000 HC RX 637 (ALT 250 FOR IP): Performed by: PSYCHIATRY & NEUROLOGY

## 2024-07-27 PROCEDURE — 1240000000 HC EMOTIONAL WELLNESS R&B

## 2024-07-27 RX ORDER — LORAZEPAM 2 MG/ML
1 INJECTION INTRAMUSCULAR EVERY 6 HOURS PRN
Status: DISCONTINUED | OUTPATIENT
Start: 2024-07-27 | End: 2024-07-29 | Stop reason: HOSPADM

## 2024-07-27 RX ORDER — ZIPRASIDONE MESYLATE 20 MG/ML
20 INJECTION, POWDER, LYOPHILIZED, FOR SOLUTION INTRAMUSCULAR EVERY 12 HOURS PRN
Status: DISCONTINUED | OUTPATIENT
Start: 2024-07-27 | End: 2024-07-29 | Stop reason: HOSPADM

## 2024-07-27 RX ORDER — ZIPRASIDONE HYDROCHLORIDE 20 MG/1
20 CAPSULE ORAL EVERY 12 HOURS PRN
Status: DISCONTINUED | OUTPATIENT
Start: 2024-07-27 | End: 2024-07-29 | Stop reason: HOSPADM

## 2024-07-27 RX ORDER — LORAZEPAM 1 MG/1
1 TABLET ORAL EVERY 6 HOURS PRN
Status: DISCONTINUED | OUTPATIENT
Start: 2024-07-27 | End: 2024-07-29 | Stop reason: HOSPADM

## 2024-07-27 RX ORDER — OXCARBAZEPINE 300 MG/1
300 TABLET, FILM COATED ORAL 2 TIMES DAILY
Status: DISCONTINUED | OUTPATIENT
Start: 2024-07-27 | End: 2024-07-29 | Stop reason: HOSPADM

## 2024-07-27 RX ADMIN — LORAZEPAM 1 MG: 1 TABLET ORAL at 19:00

## 2024-07-27 RX ADMIN — BUSPIRONE HYDROCHLORIDE 15 MG: 10 TABLET ORAL at 21:01

## 2024-07-27 RX ADMIN — OXCARBAZEPINE 300 MG: 300 TABLET, FILM COATED ORAL at 21:01

## 2024-07-27 RX ADMIN — HYDROXYZINE HYDROCHLORIDE 50 MG: 50 TABLET, FILM COATED ORAL at 11:57

## 2024-07-27 RX ADMIN — OXCARBAZEPINE 150 MG: 150 TABLET, FILM COATED ORAL at 09:08

## 2024-07-27 RX ADMIN — FLUOXETINE 40 MG: 20 CAPSULE ORAL at 09:08

## 2024-07-27 RX ADMIN — BUSPIRONE HYDROCHLORIDE 15 MG: 10 TABLET ORAL at 09:08

## 2024-07-27 ASSESSMENT — PAIN SCALES - GENERAL: PAINLEVEL_OUTOF10: 0

## 2024-07-27 NOTE — GROUP NOTE
Group Therapy Note    Date: 7/27/2024    Group Start Time: 1335  Group End Time: 1420  Group Topic: Recreational    SSR 2  NON ACUTE    Mary Brody        Group Therapy Note    Facilitated leisure skills group to reinforce positive coping and to manage mood through music, social interaction, group activities and art task       Attendees: 3/4       Patient's Goal:  Attend group daily     Notes: Pt was receptive to listening to music and songs selected while working on leisure task. Interacted with peers and staff. Left group to meet with D-19      Status After Intervention:  Improved    Participation Level: Active Listener and Interactive    Participation Quality: Appropriate and Attentive      Speech:  normal      Thought Process/Content: Logical      Affective Functioning: Congruent      Mood:  Calm      Level of consciousness:  Attentive      Response to Learning: Progressing to goal      Endings: None Reported    Modes of Intervention: Socialization and Activity      Discipline Responsible: Recreational Therapist      Signature:  KJ Maradiaga

## 2024-07-27 NOTE — PROGRESS NOTES
Progress Note  Date:2024       Room:[facility]  Patient Name:Abby Yao     YOB: 1995     Age:29 y.o.        Subjective    Subjective patient seen this morning.  He expresses that he was able to tolerate his medications and his mood has been slightly better.  He is less irritable.  He states he was able to rest the previous night.  Denies having any command auditory visual hallucinations.  Denies having any active suicidal thoughts.  Still mood presents labile and impulsive and with limited insight and coping.    Mental status examination-patient is alert oriented to name place person.  Mood is anxious and labile.  Expresses depression but states he has been feeling slightly better than the previous days.  Multiple tattoos noted.  No evidence of any active psychosis.  Insight judgment coping remains limited.  Review of Systems  Objective         Vitals Last 24 Hours:  TEMPERATURE:  Temp  Av.6 °F (37 °C)  Min: 98.1 °F (36.7 °C)  Max: 99.1 °F (37.3 °C)  RESPIRATIONS RANGE: Resp  Av  Min: 16  Max: 18  PULSE OXIMETRY RANGE: SpO2  Av %  Min: 100 %  Max: 100 %  PULSE RANGE: Pulse  Av  Min: 66  Max: 70  BLOOD PRESSURE RANGE: Systolic (24hrs), Av , Min:112 , Max:119   ; Diastolic (24hrs), Av, Min:75, Max:80    I/O (24Hr):  No intake or output data in the 24 hours ending 24 2328  Objective  Labs/Imaging/Diagnostics    Labs:  CBC:No results for input(s): \"WBC\", \"RBC\", \"HGB\", \"HCT\", \"MCV\", \"RDW\", \"PLT\" in the last 72 hours.  CHEMISTRIES:No results for input(s): \"NA\", \"K\", \"CL\", \"CO2\", \"BUN\", \"CREATININE\", \"GLUCOSE\", \"PHOS\", \"MG\" in the last 72 hours.    Invalid input(s): \"CA\"  PT/INR:No results for input(s): \"PROTIME\", \"INR\" in the last 72 hours.  APTT:No results for input(s): \"APTT\" in the last 72 hours.  LIVER PROFILE:No results for input(s): \"AST\", \"ALT\", \"BILIDIR\", \"BILITOT\", \"ALKPHOS\" in the last 72 hours.    Imaging Last 24 Hours:  No results

## 2024-07-27 NOTE — GROUP NOTE
Group Therapy Note    Date: 7/27/2024    Group Start Time: 1025  Group End Time: 1050  Group Topic: Nursing    SSR 2  NON ACUTE    Kim Morelos RN    IMPORTANCE OF MEDICATION COMPLIANCE    Group Therapy Note    Attendees: 2/4       Patient's Goal:      Notes:      Status After Intervention:      Participation Level: Patient encouraged to attend the group but declined and remained in the bed.     Participation Quality:       Speech:        Thought Process/Content:       Affective Functioning:       Mood:       Level of consciousness:        Response to Learning:       Endings:     Modes of Intervention:       Discipline Responsible: Registered Nurse      Signature:  Kim Morelos, RN

## 2024-07-27 NOTE — GROUP NOTE
Group Therapy Note    Date: 7/27/2024    Group Start Time: 0940  Group End Time: 1025  Group Topic: Education Group - Inpatient    SSR 2  NON ACUTE    Mary Brody        Group Therapy Note    Facilitated group to discuss definition and examples of healthy vs unhealthy coping strategies. Introduced examples of unhealthy scenarios to help identify consequences from using unhealthy strategies and recognizing healthy coping strategies that would be helpful and identifying barriers that may prevent using healthy coping strategies       Attendees: 2/4      Notes:  Encouraged but did not attend    Discipline Responsible: Recreational Therapist      Signature:  KJ Maradiaga

## 2024-07-28 PROCEDURE — 1240000000 HC EMOTIONAL WELLNESS R&B

## 2024-07-28 PROCEDURE — 6370000000 HC RX 637 (ALT 250 FOR IP)

## 2024-07-28 PROCEDURE — 6370000000 HC RX 637 (ALT 250 FOR IP): Performed by: PSYCHIATRY & NEUROLOGY

## 2024-07-28 RX ADMIN — FLUOXETINE 40 MG: 20 CAPSULE ORAL at 09:09

## 2024-07-28 RX ADMIN — BUSPIRONE HYDROCHLORIDE 15 MG: 10 TABLET ORAL at 09:09

## 2024-07-28 RX ADMIN — OXCARBAZEPINE 300 MG: 300 TABLET, FILM COATED ORAL at 09:09

## 2024-07-28 RX ADMIN — BUSPIRONE HYDROCHLORIDE 15 MG: 10 TABLET ORAL at 21:40

## 2024-07-28 RX ADMIN — OXCARBAZEPINE 300 MG: 300 TABLET, FILM COATED ORAL at 21:40

## 2024-07-28 NOTE — GROUP NOTE
Group Therapy Note    Date: 7/28/2024    Group Start Time: 1340  Group End Time: 1425  Group Topic: Recreational    SSR 2 BH NON ACUTE    Mary Brody        Group Therapy Note    Facilitated leisure skills group to reinforce positive coping and to manage mood through music, social interaction, group activities and art task       Attendees: 5/6      Notes:  Encouraged but did not attend    Discipline Responsible: Recreational Therapist      Signature:  KJ Maradiaga

## 2024-07-29 VITALS
BODY MASS INDEX: 26.66 KG/M2 | HEART RATE: 59 BPM | DIASTOLIC BLOOD PRESSURE: 74 MMHG | OXYGEN SATURATION: 100 % | RESPIRATION RATE: 17 BRPM | WEIGHT: 160 LBS | SYSTOLIC BLOOD PRESSURE: 118 MMHG | HEIGHT: 65 IN | TEMPERATURE: 98.4 F

## 2024-07-29 PROCEDURE — 6370000000 HC RX 637 (ALT 250 FOR IP)

## 2024-07-29 PROCEDURE — 6370000000 HC RX 637 (ALT 250 FOR IP): Performed by: PSYCHIATRY & NEUROLOGY

## 2024-07-29 RX ORDER — OXCARBAZEPINE 300 MG/1
300 TABLET, FILM COATED ORAL 2 TIMES DAILY
Qty: 60 TABLET | Refills: 1 | Status: SHIPPED | OUTPATIENT
Start: 2024-07-29

## 2024-07-29 RX ORDER — TRAZODONE HYDROCHLORIDE 50 MG/1
50 TABLET ORAL NIGHTLY PRN
Qty: 30 TABLET | Refills: 1 | Status: SHIPPED | OUTPATIENT
Start: 2024-07-29

## 2024-07-29 RX ORDER — FLUOXETINE HYDROCHLORIDE 40 MG/1
40 CAPSULE ORAL DAILY
Qty: 30 CAPSULE | Refills: 1 | Status: SHIPPED | OUTPATIENT
Start: 2024-07-30

## 2024-07-29 RX ORDER — BUSPIRONE HYDROCHLORIDE 15 MG/1
15 TABLET ORAL 2 TIMES DAILY
Qty: 30 TABLET | Refills: 1 | Status: SHIPPED | OUTPATIENT
Start: 2024-07-29

## 2024-07-29 RX ADMIN — OXCARBAZEPINE 300 MG: 300 TABLET, FILM COATED ORAL at 09:01

## 2024-07-29 RX ADMIN — FLUOXETINE 40 MG: 20 CAPSULE ORAL at 09:12

## 2024-07-29 RX ADMIN — BUSPIRONE HYDROCHLORIDE 15 MG: 10 TABLET ORAL at 08:49

## 2024-07-29 NOTE — GROUP NOTE
Group Therapy Note    Date: 7/29/2024    Group Start Time: 0940  Group End Time: 1035  Group Topic: Education Group - Inpatient    SSR 2  NON ACUTE    Mary Brody        Group Therapy Note    Facilitated discussion focus on identifying different barriers that has prevented progress and identifying ways to confront them       Attendees: 7/7       Patient's Goal:  Attend group daily     Notes:   Receptive to information discussed on different barriers. Pt left group. Did not return    Status After Intervention:  Unchanged    Participation Level: Minimal    Participation Quality: Appropriate      Speech:  normal      Thought Process/Content: Logical      Affective Functioning: Blunted      Mood:  anxious      Level of consciousness:  Preoccupied      Response to Learning: Progressing to goal      Endings: None Reported    Modes of Intervention: Education and Support      Discipline Responsible: Recreational Therapist      Signature:  KJ Maradiaga

## 2024-07-29 NOTE — GROUP NOTE
Group Therapy Note    Date: 7/29/2024    Group Start Time: 1115  Group End Time: 1200  Group Topic: Process Group - Inpatient    SSR 2 [facility] ACUTE    Haydee Wu        Group Therapy Note  Process group was focused on anxiety and how to cope. Writer passed out a quote that stated \"Your feelings are always valid, Your behavior is not....Feel what you feel but you need to be accountable for what you do as a result of those feelings\"! Writer utilized the coping and anxiety ball as well. Pts passed the ball around and answered the questions.   Attendees: 7-7       Patient's Goal:  \"to continue to work on communication\"    Notes:  Pt was in his hearing for half of the group. He shared that he ended up in the hospital because he puts everyone else first and not himself. He encouraged others to take care of themselves. Pt will go for more walks when he returns home    Status After Intervention:  Improved    Participation Level: Active Listener and Interactive    Participation Quality: Appropriate and Attentive      Speech:  pressured      Thought Process/Content: Logical      Affective Functioning: Exaggerated      Mood: euthymic      Level of consciousness:  Alert, Oriented x4, and Attentive      Response to Learning: Able to verbalize current knowledge/experience, Able to verbalize/acknowledge new learning, Able to retain information, Capable of insight, and Progressing to goal      Endings: None Reported    Modes of Intervention: Support, Socialization, Exploration, Problem-solving, and Limit-setting      Discipline Responsible: /Counselor      Signature:  Haydee Wu

## 2024-07-29 NOTE — BH NOTE
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    Weakness-poor coping, comorbid substance abuse, poor primary support, psychosocial stressors    Discharge Criteria-  Patient is able to show progress and improvement in neurovegetative symptoms of depression, debility and suicidal ideation mood swings     patient is no longer actively suicidal or homicidal and has no command hallucinations.   Patient  is able to present with healthy ways to cope with current stressors.           ESTIMATED LENGTH OF STAY:    5-6 days                              SIGNED:    Dougie Saunders  7/25/2024

## 2024-07-29 NOTE — DISCHARGE SUMMARY
Social Connections: Moderately Isolated (7/29/2024)    Social Connections ([facility] HRSN)     If for any reason you need help with day-to-day activities such as bathing, preparing meals, shopping, managing finances, etc., do you get the help you need?: Not on file   Intimate Partner Violence: Not on file   Housing Stability: Patient Declined (7/24/2024)    Housing Stability Vital Sign     Unable to Pay for Housing in the Last Year: Patient declined     Number of Places Lived in the Last Year: 1     Unstable Housing in the Last Year: Patient declined      FAMILY HISTORY:   No family history on file.          HOSPITALIZATION COURSE:    Abby Yao was admitted to the inpatient psychiatric unit [facility] for acute psychiatric stabilization in regards to symptomatology as described in the HPI above.  While on the unit Abby Yao was involved in individual, group, occupational and milieu therapy.  Psychiatric medications were adjusted during this hospitalization. Abby Yao demonstrated a slow, but progressive improvement in overall condition. Much of patient's depression appeared to be related to situational stressors, effects of drugs of abuse, and psychological factors.  Please see individual progress notes for more specific details regarding patient's hospitalization course.     At time of discharge, Abby Yao is without significant problems of depression, psychosis, antonia. Patient free of suicidal and homicidal ideations and reports many positive predictive factors in terms of not attempting suicide or homicide.Patient with request for discharge today.There are no grounds to seek a TDO.Patient has maximized benefit to be derived from acute inpatient psychiatric treatment.  All members of the treatment team concur with each other in regards to plans for discharge today per patient's request.  Patient and family are aware and in agreement with discharge and discharge plan.

## 2024-07-29 NOTE — PLAN OF CARE
Problem: Behavior  Goal: Pt/Family maintain appropriate behavior and adhere to behavioral management agreement, if implemented  Description: INTERVENTIONS:  1. Assess patient/family's coping skills and  non-compliant behavior (including use of illegal substances)  2. Notify security of behavior or suspected illegal substances which indicate the need for search of the family and/or belongings  3. Encourage verbalization of thoughts and concerns in a socially appropriate manner  4. Utilize positive, consistent limit setting strategies supporting safety of patient, staff and others  5. Encourage participation in the decision making process about the behavioral management agreement  6. If a visitor's behavior poses a threat to safety call refer to organization policy.  7. Initiate consult with , Psychosocial CNS, Spiritual Care as appropriate  Outcome: Progressing     Problem: Depression/Self Harm  Goal: Effect of psychiatric condition will be minimized and patient will be protected from self harm  Description: INTERVENTIONS:  1. Assess impact of patient's symptoms on level of functioning, self care needs and offer support as indicated  2. Assess patient/family knowledge of depression, impact on illness and need for teaching  3. Provide emotional support, presence and reassurance  4. Assess for possible suicidal thoughts or ideation. If patient expresses suicidal thoughts or statements do not leave alone, initiate Suicide Precautions, move to a room close to the nursing station and obtain sitter  5. Initiate consults as appropriate with Mental Health Professional, Spiritual Care, Psychosocial CNS, and consider a recommendation to the [facility]IP for a Psychiatric Consultation  7/25/2024 1115 by Shira Santos RN  Outcome: Progressing     
  Problem: Coping  Goal: Pt/Family able to verbalize concerns and demonstrate effective coping strategies  Description: INTERVENTIONS:  1. Assist patient/family to identify coping skills, available support systems and cultural and spiritual values  2. Provide emotional support, including active listening and acknowledgement of concerns of patient and caregivers  3. Reduce environmental stimuli, as able  4. Instruct patient/family in relaxation techniques, as appropriate  5. Assess for spiritual pain/suffering and initiate Spiritual Care, Psychosocial Clinical Specialist consults as needed  7/26/2024 2128 by Omar Silver RN  Outcome: Not Progressing  7/26/2024 0923 by Courtney Heredia LPN  Outcome: Progressing     Problem: Coping  Goal: Pt/Family able to verbalize concerns and demonstrate effective coping strategies  Description: INTERVENTIONS:  1. Assist patient/family to identify coping skills, available support systems and cultural and spiritual values  2. Provide emotional support, including active listening and acknowledgement of concerns of patient and caregivers  3. Reduce environmental stimuli, as able  4. Instruct patient/family in relaxation techniques, as appropriate  5. Assess for spiritual pain/suffering and initiate Spiritual Care, Psychosocial Clinical Specialist consults as needed  7/26/2024 2128 by Omar Silver RN  Outcome: Not Progressing  7/26/2024 0923 by Courtney Heredia LPN  Outcome: Progressing     Problem: Behavior  Goal: Pt/Family maintain appropriate behavior and adhere to behavioral management agreement, if implemented  Description: INTERVENTIONS:  1. Assess patient/family's coping skills and  non-compliant behavior (including use of illegal substances)  2. Notify security of behavior or suspected illegal substances which indicate the need for search of the family and/or belongings  3. Encourage verbalization of thoughts and concerns in a socially appropriate manner  4. Utilize 
  Problem: Depression/Self Harm  Goal: Effect of psychiatric condition will be minimized and patient will be protected from self harm  Description: INTERVENTIONS:  1. Assess impact of patient's symptoms on level of functioning, self care needs and offer support as indicated  2. Assess patient/family knowledge of depression, impact on illness and need for teaching  3. Provide emotional support, presence and reassurance  4. Assess for possible suicidal thoughts or ideation. If patient expresses suicidal thoughts or statements do not leave alone, initiate Suicide Precautions, move to a room close to the nursing station and obtain sitter  5. Initiate consults as appropriate with Mental Health Professional, Spiritual Care, Psychosocial CNS, and consider a recommendation to the [facility]IP for a Psychiatric Consultation  Outcome: Progressing     
  Problem: Discharge Planning  Goal: Discharge to home or other facility with appropriate resources  7/24/2024 2037 by Sarah Hwang LPN  Outcome: Progressing  7/24/2024 1826 by Melsisa Asif RN  Outcome: Progressing     
  Problem: Discharge Planning  Goal: Discharge to home or other facility with appropriate resources  7/27/2024 2337 by Ashley Jones RN  Outcome: Progressing  7/27/2024 2337 by Ashley Jones RN  Outcome: Progressing  Flowsheets (Taken 7/27/2024 2000)  Discharge to home or other facility with appropriate resources:   Identify barriers to discharge with patient and caregiver   Identify discharge learning needs (meds, wound care, etc)   Refer to discharge planning if patient needs post-hospital services based on physician order or complex needs related to functional status, cognitive ability or social support system   Arrange for needed discharge resources and transportation as appropriate     Problem: Anxiety  Goal: Will report anxiety at manageable levels  Description: INTERVENTIONS:  1. Administer medication as ordered  2. Teach and rehearse alternative coping skills  3. Provide emotional support with 1:1 interaction with staff  7/27/2024 2337 by Ashley Jones RN  Outcome: Progressing  7/27/2024 2337 by Ashley Jones RN  Outcome: Progressing     Problem: Coping  Goal: Pt/Family able to verbalize concerns and demonstrate effective coping strategies  Description: INTERVENTIONS:  1. Assist patient/family to identify coping skills, available support systems and cultural and spiritual values  2. Provide emotional support, including active listening and acknowledgement of concerns of patient and caregivers  3. Reduce environmental stimuli, as able  4. Instruct patient/family in relaxation techniques, as appropriate  5. Assess for spiritual pain/suffering and initiate Spiritual Care, Psychosocial Clinical Specialist consults as needed  7/27/2024 2337 by Ashley Jones RN  Outcome: Progressing  7/27/2024 2337 by Ashley Jones RN  Outcome: Progressing     Problem: Behavior  Goal: Pt/Family maintain appropriate behavior and adhere to behavioral management agreement, if implemented  Description: 
  Problem: Discharge Planning  Goal: Discharge to home or other facility with appropriate resources  Outcome: Progressing     Problem: Anxiety  Goal: Will report anxiety at manageable levels  Description: INTERVENTIONS:  1. Administer medication as ordered  2. Teach and rehearse alternative coping skills  3. Provide emotional support with 1:1 interaction with staff  7/29/2024 0925 by Becka Sales RN  Outcome: Progressing  7/28/2024 2309 by Dante Welch RN  Outcome: Progressing     Problem: Coping  Goal: Pt/Family able to verbalize concerns and demonstrate effective coping strategies  Description: INTERVENTIONS:  1. Assist patient/family to identify coping skills, available support systems and cultural and spiritual values  2. Provide emotional support, including active listening and acknowledgement of concerns of patient and caregivers  3. Reduce environmental stimuli, as able  4. Instruct patient/family in relaxation techniques, as appropriate  5. Assess for spiritual pain/suffering and initiate Spiritual Care, Psychosocial Clinical Specialist consults as needed  7/29/2024 0925 by Becka aSles RN  Outcome: Progressing  7/28/2024 2309 by Dante Welch RN  Outcome: Progressing     Problem: Behavior  Goal: Pt/Family maintain appropriate behavior and adhere to behavioral management agreement, if implemented  Description: INTERVENTIONS:  1. Assess patient/family's coping skills and  non-compliant behavior (including use of illegal substances)  2. Notify security of behavior or suspected illegal substances which indicate the need for search of the family and/or belongings  3. Encourage verbalization of thoughts and concerns in a socially appropriate manner  4. Utilize positive, consistent limit setting strategies supporting safety of patient, staff and others  5. Encourage participation in the decision making process about the behavioral management agreement  6. If a visitor's behavior poses a threat to 
appropriate  7/28/2024 2309 by Dante Welch RN  Outcome: Progressing  7/28/2024 1031 by Melissa Asif RN  Outcome: Progressing     Problem: Depression/Self Harm  Goal: Effect of psychiatric condition will be minimized and patient will be protected from self harm  Description: INTERVENTIONS:  1. Assess impact of patient's symptoms on level of functioning, self care needs and offer support as indicated  2. Assess patient/family knowledge of depression, impact on illness and need for teaching  3. Provide emotional support, presence and reassurance  4. Assess for possible suicidal thoughts or ideation. If patient expresses suicidal thoughts or statements do not leave alone, initiate Suicide Precautions, move to a room close to the nursing station and obtain sitter  5. Initiate consults as appropriate with Mental Health Professional, Spiritual Care, Psychosocial CNS, and consider a recommendation to the [facility] for a Psychiatric Consultation  7/28/2024 2309 by Dante Welch RN  Outcome: Progressing  7/28/2024 1031 by Melissa Asif, RN  Outcome: Progressing

## 2024-07-29 NOTE — GROUP NOTE
Group Therapy Note    Date: 7/29/2024    Group Start Time: 1340  Group End Time: 1445  Group Topic: Recreational    SSR 2  NON ACUTE    Mary Brody        Group Therapy Note    Facilitated leisure skills group to reinforce positive coping and to manage mood through music, social interaction, group activities and art task      Attendees: 6/6       Patient's Goal:  Attend group daily     Notes:  Pt was receptive to listening to music and songs selected. Interacted with peers and staff. Declined to work on leisure task      Status After Intervention:  Improved    Participation Level: Active Listener and Interactive    Participation Quality: Appropriate      Speech:  normal      Thought Process/Content: Logical      Affective Functioning: Congruent      Mood:  Calm      Level of consciousness:  Alert      Response to Learning: Progressing to goal      Endings: None Reported    Modes of Intervention: Socialization and Activity      Discipline Responsible: Recreational Therapist      Signature:  KJ Maradiaga

## 2025-08-22 ENCOUNTER — HOSPITAL ENCOUNTER (EMERGENCY)
Facility: HOSPITAL | Age: 30
Discharge: HOME | End: 2025-08-23
Attending: EMERGENCY MEDICINE
Payer: MEDICAID

## 2025-08-22 DIAGNOSIS — R11.2 NAUSEA VOMITING AND DIARRHEA: Primary | ICD-10-CM

## 2025-08-22 DIAGNOSIS — R19.7 NAUSEA VOMITING AND DIARRHEA: Primary | ICD-10-CM

## 2025-08-22 PROCEDURE — 99284 EMERGENCY DEPT VISIT MOD MDM: CPT

## 2025-08-23 VITALS
RESPIRATION RATE: 16 BRPM | TEMPERATURE: 97.7 F | DIASTOLIC BLOOD PRESSURE: 61 MMHG | WEIGHT: 160 LBS | HEIGHT: 65 IN | HEART RATE: 80 BPM | SYSTOLIC BLOOD PRESSURE: 116 MMHG | OXYGEN SATURATION: 98 % | BODY MASS INDEX: 26.66 KG/M2

## 2025-08-23 LAB
ALBUMIN SERPL-MCNC: 3.8 G/DL (ref 3.5–5.2)
ALBUMIN/GLOB SERPL: 1.7 (ref 1.1–2.2)
ALP SERPL-CCNC: 57 U/L (ref 35–104)
ALT SERPL-CCNC: 5 U/L (ref 10–35)
ANION GAP SERPL CALC-SCNC: 11 MMOL/L (ref 2–12)
AST SERPL-CCNC: 14 U/L (ref 10–35)
BASOPHILS # BLD: 0.04 K/UL (ref 0–0.1)
BASOPHILS NFR BLD: 0.5 % (ref 0–1)
BILIRUB SERPL-MCNC: 0.2 MG/DL (ref 0–1.2)
BUN SERPL-MCNC: 9 MG/DL (ref 6–20)
BUN/CREAT SERPL: 12 (ref 12–20)
CALCIUM SERPL-MCNC: 8.1 MG/DL (ref 8.6–10)
CHLORIDE SERPL-SCNC: 111 MMOL/L (ref 98–107)
CO2 SERPL-SCNC: 22 MMOL/L (ref 22–29)
CREAT SERPL-MCNC: 0.77 MG/DL (ref 0.5–0.9)
DIFFERENTIAL METHOD BLD: ABNORMAL
EOSINOPHIL # BLD: 0.28 K/UL (ref 0–0.4)
EOSINOPHIL NFR BLD: 3.7 % (ref 0–7)
ERYTHROCYTE [DISTWIDTH] IN BLOOD BY AUTOMATED COUNT: 13.4 % (ref 11.5–14.5)
GLOBULIN SER CALC-MCNC: 2.3 G/DL (ref 2–4)
GLUCOSE SERPL-MCNC: 75 MG/DL (ref 65–100)
HCT VFR BLD AUTO: 34.9 % (ref 35–47)
HGB BLD-MCNC: 11.6 G/DL (ref 11.5–16)
IMM GRANULOCYTES # BLD AUTO: 0.03 K/UL (ref 0–0.04)
IMM GRANULOCYTES NFR BLD AUTO: 0.4 % (ref 0–0.5)
LIPASE SERPL-CCNC: 29 U/L (ref 13–60)
LYMPHOCYTES # BLD: 2.8 K/UL (ref 0.8–3.5)
LYMPHOCYTES NFR BLD: 36.7 % (ref 12–49)
MAGNESIUM SERPL-MCNC: 1.7 MG/DL (ref 1.6–2.6)
MCH RBC QN AUTO: 30.8 PG (ref 26–34)
MCHC RBC AUTO-ENTMCNC: 33.2 G/DL (ref 30–36.5)
MCV RBC AUTO: 92.6 FL (ref 80–99)
MONOCYTES # BLD: 0.48 K/UL (ref 0–1)
MONOCYTES NFR BLD: 6.3 % (ref 5–13)
NEUTS SEG # BLD: 4 K/UL (ref 1.8–8)
NEUTS SEG NFR BLD: 52.4 % (ref 32–75)
NRBC # BLD: 0 K/UL (ref 0–0.01)
NRBC BLD-RTO: 0 PER 100 WBC
PLATELET # BLD AUTO: 220 K/UL (ref 150–400)
PMV BLD AUTO: 10.2 FL (ref 8.9–12.9)
POTASSIUM SERPL-SCNC: 4.2 MMOL/L (ref 3.5–5.1)
PROT SERPL-MCNC: 6.1 G/DL (ref 6.4–8.3)
RBC # BLD AUTO: 3.77 M/UL (ref 3.8–5.2)
SODIUM SERPL-SCNC: 144 MMOL/L (ref 136–145)
WBC # BLD AUTO: 7.6 K/UL (ref 3.6–11)

## 2025-08-23 PROCEDURE — 96374 THER/PROPH/DIAG INJ IV PUSH: CPT

## 2025-08-23 PROCEDURE — 2580000003 HC RX 258: Performed by: EMERGENCY MEDICINE

## 2025-08-23 PROCEDURE — 85025 COMPLETE CBC W/AUTO DIFF WBC: CPT

## 2025-08-23 PROCEDURE — 36415 COLL VENOUS BLD VENIPUNCTURE: CPT

## 2025-08-23 PROCEDURE — 6370000000 HC RX 637 (ALT 250 FOR IP): Performed by: EMERGENCY MEDICINE

## 2025-08-23 PROCEDURE — 83690 ASSAY OF LIPASE: CPT

## 2025-08-23 PROCEDURE — 96375 TX/PRO/DX INJ NEW DRUG ADDON: CPT

## 2025-08-23 PROCEDURE — 80053 COMPREHEN METABOLIC PANEL: CPT

## 2025-08-23 PROCEDURE — 83735 ASSAY OF MAGNESIUM: CPT

## 2025-08-23 PROCEDURE — 6360000002 HC RX W HCPCS: Performed by: EMERGENCY MEDICINE

## 2025-08-23 RX ORDER — ONDANSETRON 4 MG/1
4 TABLET, ORALLY DISINTEGRATING ORAL EVERY 6 HOURS PRN
Qty: 15 TABLET | Refills: 0 | Status: SHIPPED | OUTPATIENT
Start: 2025-08-23

## 2025-08-23 RX ORDER — ONDANSETRON 2 MG/ML
4 INJECTION INTRAMUSCULAR; INTRAVENOUS ONCE
Status: COMPLETED | OUTPATIENT
Start: 2025-08-23 | End: 2025-08-23

## 2025-08-23 RX ORDER — HYOSCYAMINE SULFATE 100 MG/1
125 TABLET ORAL EVERY 6 HOURS PRN
Qty: 15 TABLET | Refills: 0 | Status: SHIPPED | OUTPATIENT
Start: 2025-08-23

## 2025-08-23 RX ORDER — SODIUM CHLORIDE, SODIUM LACTATE, POTASSIUM CHLORIDE, AND CALCIUM CHLORIDE .6; .31; .03; .02 G/100ML; G/100ML; G/100ML; G/100ML
1000 INJECTION, SOLUTION INTRAVENOUS ONCE
Status: COMPLETED | OUTPATIENT
Start: 2025-08-23 | End: 2025-08-23

## 2025-08-23 RX ORDER — LIDOCAINE HYDROCHLORIDE 20 MG/ML
15 SOLUTION OROPHARYNGEAL PRN
Qty: 100 ML | Refills: 0 | Status: SHIPPED | OUTPATIENT
Start: 2025-08-23

## 2025-08-23 RX ORDER — KETOROLAC TROMETHAMINE 30 MG/ML
15 INJECTION, SOLUTION INTRAMUSCULAR; INTRAVENOUS ONCE
Status: COMPLETED | OUTPATIENT
Start: 2025-08-23 | End: 2025-08-23

## 2025-08-23 RX ORDER — LIDOCAINE HYDROCHLORIDE 20 MG/ML
15 SOLUTION OROPHARYNGEAL
Status: COMPLETED | OUTPATIENT
Start: 2025-08-23 | End: 2025-08-23

## 2025-08-23 RX ADMIN — KETOROLAC TROMETHAMINE 15 MG: 30 INJECTION, SOLUTION INTRAMUSCULAR at 01:16

## 2025-08-23 RX ADMIN — ONDANSETRON 4 MG: 2 INJECTION, SOLUTION INTRAMUSCULAR; INTRAVENOUS at 01:17

## 2025-08-23 RX ADMIN — SODIUM CHLORIDE, SODIUM LACTATE, POTASSIUM CHLORIDE, AND CALCIUM CHLORIDE 1000 ML: .6; .31; .03; .02 INJECTION, SOLUTION INTRAVENOUS at 01:17

## 2025-08-23 RX ADMIN — LIDOCAINE HYDROCHLORIDE 15 ML: 20 SOLUTION ORAL at 01:53
